# Patient Record
Sex: MALE | Race: WHITE
[De-identification: names, ages, dates, MRNs, and addresses within clinical notes are randomized per-mention and may not be internally consistent; named-entity substitution may affect disease eponyms.]

---

## 2017-01-05 ENCOUNTER — HOSPITAL ENCOUNTER (INPATIENT)
Dept: HOSPITAL 45 - C.EDB | Age: 72
LOS: 4 days | Discharge: HOME | DRG: 192 | End: 2017-01-09
Attending: HOSPITALIST | Admitting: HOSPITALIST
Payer: COMMERCIAL

## 2017-01-05 VITALS
WEIGHT: 133.6 LBS | BODY MASS INDEX: 20.97 KG/M2 | HEIGHT: 67 IN | WEIGHT: 133.6 LBS | HEIGHT: 67 IN | BODY MASS INDEX: 20.97 KG/M2

## 2017-01-05 VITALS
OXYGEN SATURATION: 97 % | HEART RATE: 71 BPM | TEMPERATURE: 97.52 F | SYSTOLIC BLOOD PRESSURE: 179 MMHG | DIASTOLIC BLOOD PRESSURE: 70 MMHG

## 2017-01-05 VITALS — HEART RATE: 90 BPM | OXYGEN SATURATION: 96 %

## 2017-01-05 DIAGNOSIS — Z79.82: ICD-10-CM

## 2017-01-05 DIAGNOSIS — I12.9: ICD-10-CM

## 2017-01-05 DIAGNOSIS — Z87.891: ICD-10-CM

## 2017-01-05 DIAGNOSIS — I45.10: ICD-10-CM

## 2017-01-05 DIAGNOSIS — Z79.51: ICD-10-CM

## 2017-01-05 DIAGNOSIS — K44.9: ICD-10-CM

## 2017-01-05 DIAGNOSIS — E78.5: ICD-10-CM

## 2017-01-05 DIAGNOSIS — K21.9: ICD-10-CM

## 2017-01-05 DIAGNOSIS — Z86.79: ICD-10-CM

## 2017-01-05 DIAGNOSIS — F41.9: ICD-10-CM

## 2017-01-05 DIAGNOSIS — Z99.81: ICD-10-CM

## 2017-01-05 DIAGNOSIS — N18.3: ICD-10-CM

## 2017-01-05 DIAGNOSIS — F32.9: ICD-10-CM

## 2017-01-05 DIAGNOSIS — Z79.52: ICD-10-CM

## 2017-01-05 DIAGNOSIS — Z79.899: ICD-10-CM

## 2017-01-05 DIAGNOSIS — K22.70: ICD-10-CM

## 2017-01-05 DIAGNOSIS — N40.0: ICD-10-CM

## 2017-01-05 DIAGNOSIS — Z85.51: ICD-10-CM

## 2017-01-05 DIAGNOSIS — J44.1: Primary | ICD-10-CM

## 2017-01-05 LAB
ALBUMIN/GLOB SERPL: 1 {RATIO} (ref 0.9–2)
ALP SERPL-CCNC: 98 U/L (ref 45–117)
ALT SERPL-CCNC: 28 U/L (ref 12–78)
ANION GAP SERPL CALC-SCNC: 8 MMOL/L (ref 3–11)
AST SERPL-CCNC: 21 U/L (ref 15–37)
BASOPHILS # BLD: 0.01 K/UL (ref 0–0.2)
BASOPHILS NFR BLD: 0.1 %
BUN SERPL-MCNC: 19 MG/DL (ref 7–18)
BUN/CREAT SERPL: 15.8 (ref 10–20)
CALCIUM SERPL-MCNC: 8.3 MG/DL (ref 8.5–10.1)
CHLORIDE SERPL-SCNC: 102 MMOL/L (ref 98–107)
CO2 SERPL-SCNC: 34 MMOL/L (ref 21–32)
COMPLETE: YES
CREAT CL PREDICTED SERPL C-G-VRATE: 48 ML/MIN
CREAT SERPL-MCNC: 1.2 MG/DL (ref 0.6–1.4)
EOSINOPHIL NFR BLD AUTO: 148 K/UL (ref 130–400)
GLOBULIN SER-MCNC: 3.1 GM/DL (ref 2.5–4)
GLUCOSE SERPL-MCNC: 95 MG/DL (ref 70–99)
HCT VFR BLD CALC: 37.7 % (ref 42–52)
IG%: 0.6 %
IMM GRANULOCYTES NFR BLD AUTO: 9.9 %
INR PPP: 1 (ref 0.9–1.1)
LYMPHOCYTES # BLD: 1.06 K/UL (ref 1.2–3.4)
MCH RBC QN AUTO: 29.3 PG (ref 25–34)
MCHC RBC AUTO-ENTMCNC: 32.1 G/DL (ref 32–36)
MCV RBC AUTO: 91.3 FL (ref 80–100)
MONOCYTES NFR BLD: 10.1 %
NEUTROPHILS # BLD AUTO: 1.8 %
NEUTROPHILS NFR BLD AUTO: 77.5 %
PMV BLD AUTO: 10 FL (ref 7.4–10.4)
POTASSIUM SERPL-SCNC: 3.8 MMOL/L (ref 3.5–5.1)
PROTHROMBIN TIME: 10.9 SECONDS (ref 9–12)
RBC # BLD AUTO: 4.13 M/UL (ref 4.7–6.1)
SODIUM SERPL-SCNC: 144 MMOL/L (ref 136–145)
WBC # BLD AUTO: 10.73 K/UL (ref 4.8–10.8)

## 2017-01-05 RX ADMIN — HEPARIN SODIUM SCH UNIT: 10000 INJECTION, SOLUTION INTRAVENOUS; SUBCUTANEOUS at 21:26

## 2017-01-05 RX ADMIN — LACTOBACILLUS TAB SCH TAB: TAB at 21:00

## 2017-01-05 RX ADMIN — CARVEDILOL SCH MG: 12.5 TABLET, FILM COATED ORAL at 21:21

## 2017-01-05 RX ADMIN — METHYLPREDNISOLONE SODIUM SUCCINATE SCH MLS/MIN: 1 INJECTION, POWDER, FOR SOLUTION INTRAMUSCULAR; INTRAVENOUS at 23:36

## 2017-01-05 RX ADMIN — BUDESONIDE AND FORMOTEROL FUMARATE DIHYDRATE SCH PUFFS: 160; 4.5 AEROSOL RESPIRATORY (INHALATION) at 21:19

## 2017-01-05 RX ADMIN — IPRATROPIUM BROMIDE AND ALBUTEROL SULFATE SCH ML: .5; 3 SOLUTION RESPIRATORY (INHALATION) at 21:27

## 2017-01-05 NOTE — HISTORY & PHYSICAL EXAMINATION
DATE OF ADMISSION:  01/05/2017

 

CHIEF COMPLAINT:  Shortness of breath.

 

HISTORY OF PRESENT ILLNESS:  This is a 71-year-old male with past medical

history significant for severe COPD, on oxygen at night, previous history of

smoking, hypertension, hyperlipidemia, chronic kidney disease stage III,

history of GERD, history of Pritchett esophagus, history of hiatal hernia,

history of bladder cancer who was recently in the hospital prior to [facility]

with severe COPD exacerbation and syncope, but he wanted to be discharged for

his [place] and the next day he did fine and got discharged with prednisone

taper and on p.o. antibiotics.  The patient says he did fine after that, he

has followed with family doctor also, but couple of days ago again he started

to have some cold-like symptoms and some sore throat and started to again

have shortness of breath and cough, which was not getting better, so he came

to the ER today.  Even after 1 hour of treatment, the patient is not back to

his baseline, so we are called for admission.  The patient denies any fever

or chills, but says whenever he gets short of breath has some chest heaviness. 

Denies any headaches, no dizziness, no nausea, no vomiting, no diarrhea, no

abdominal pain.  Resting comfortably and hemodynamically stable.

 

ALLERGIES:  IODINATED DIAGNOSTIC AGENTS.

 

PAST MEDICAL HISTORY:  As mentioned above.

 

PAST SURGICAL HISTORY:  EGD and upper GI endoscopy.

 

FAMILY HISTORY:  Significant for mother had COPD and father had COPD.

 

SOCIAL HISTORY:  , former smoker and drinks alcohol occasionally.

 

MEDICATIONS:  The patient is on prednisone rescue kit, albuterol 1-2 puffs

inhalation q. 4-6 hours p.r.n., amlodipine 5 mg p.o. daily, aspirin 81 mg

p.o. daily, Lipitor 40 mg p.o. daily, Symbicort 160/4.5 two puffs inhalation

b.i.d., Coreg 12.5 mg p.o. b.i.d., citalopram 20 mg p.o. daily, folic acid 1

mg p.o. daily, DuoNebs 3 mL q.i.d. nebulization, Lactinex 2 tablets p.o.

b.i.d., Ativan 1 mg p.o. b.i.d. p.r.n., omeprazole 20 mg p.o. daily, Flomax

0.4 mg p.o. daily.

 

REVIEW OF SYMPTOMS:  As per HPI.

 

PHYSICAL EXAMINATION:

GENERAL:  The patient is of moderate build, not in distress.

VITAL SIGNS:  Temperature 36.7, pulse 74, respiratory rate 22, blood pressure

151/73, oxygen 95% on 3 liters.

HEENT:  No pallor, no icterus.  Pupils equal, round, and reactive to light.

NECK:  No JVD, no neck masses, no carotid bruits.

CARDIOVASCULAR:  S1, S2 heard, regular rate and rhythm, no murmur, no gallop.

RESPIRATORY SYSTEM:  Clear to auscultation bilaterally, diminished bilateral

breath sounds.  Occasional wheezing, no crackles.

ABDOMEN:  Soft, bowel sounds present.  Nontender.  No distention.

CENTRAL NERVOUS SYSTEM:  Cranial nerves II-XII grossly intact.  Nonfocal.

EXTREMITIES:  No edema, no erythema.

 

LABORATORY DATA:  WBC 10.7, hemoglobin 12.1, hematocrit 37.7, platelets 148. 

Sodium 144, potassium 3.8, chloride 102, bicarb 34, BUN 19, creatinine 1.2,

serum glucose 95.  Calcium 8.3, total bilirubin 0.4, AST 21, ALT 28, alkaline

phosphatase 98, troponin 0.016  Rapid influenza A and B negative.

 

IMAGING DATA:  Chest x-ray, no acute findings seen.

 

EKG:  Shows normal sinus rhythm with rate of 69, right bundle branch block,

no significant change from previous EKG.

 

ASSESSMENT AND PLAN:  This is a 71-year-old male who presents with chronic

obstructive pulmonary disease exacerbation.

1.  Chronic obstructive pulmonary disease exacerbation.  Second admission in

the last few weeks.  We will admit to tele floor,start him on IV Solu-Medrol

40 mg p.o. t.i.d., DuoNebs q. 6 hours around the clock and q. 2 hours p.r.n.

and also p.o. azithromycin and monitored on tele floor.  We will check the

influenza pcr test.

2.  History of recent syncope , while will check

echocardiogram.  Follow with echocardiogram.

3.  History of gastroesophageal reflux disease.  Continue proton pump

inhibitor.

4.  History of hypertension.  Continue amlodipine and Coreg.  Will monitor

the blood pressure.

5.  History of depression and  anxiety. Celexa and Ativan p.r.n.  

6. BPH. continue Flomax.

6.  Deep vein thrombosis prophylaxis, sequential compression devices and

heparin subQ.

 

DISPOSITION:  Admit to tele floor.  Expect to discharge home and follow with

his family doctor.  Level 1 full code.

 

 

 

ANDRED

## 2017-01-05 NOTE — DIAGNOSTIC IMAGING REPORT
CHEST ONE VIEW PORTABLE



CLINICAL HISTORY: Respiratory distress. Dyspnea.    



COMPARISON STUDY:  Chest radiograph December 24, 2016.



FINDINGS: There is no pneumothorax or pleural effusion. No consolidation is

identified. There is no evidence of pulmonary edema. Cardiac size is normal.

Mediastinal contours are normal. There is suspected underlying emphysema. Healed

left rib fractures are noted. 



IMPRESSION:  



1. No acute cardiopulmonary findings.



2. Emphysema. 









Electronically signed by:  Je Shaffer M.D.

1/5/2017 3:16 PM



Dictated Date/Time:  1/5/2017 3:13 PM

## 2017-01-05 NOTE — EMERGENCY ROOM VISIT NOTE
History


Report prepared by Luis Miguel:  Elsa Peña


Under the Supervision of:  Dr. Andriy Martinez M.D.


First contact with patient:  14:24


Chief Complaint:  RESPIRATORY PROBLEMS


Stated Complaint:  COPD/BREATHING


Nursing Triage Summary:  


Pt c/o SOB, cough, productive, yellow, couple days, chest tightness. Denies 


vomiting.





Pulse ox 89-91% on RA. HX COPD





Pt wears 2 L O2 NC at home.





History of Present Illness


The patient is a 71 year old male who presents to the Emergency Room with 

complaints of persistent, worsening breathing difficulties that began 

yesterday.  He currently rates his discomfort as a 2.5/10 in severity.  The 

patient states that his breathing difficulties began yesterday and additionally 

notes heaviness in his chest, and productive cough with yellow sputum.  He 

notes a history of COPD.  The patient's wife notes that the patient was 

evaluated in the hospital on 12/24/16 for a COPD exacerbation and was 

discharged on 12/25/16.  The patient notes that he is chronically on 

supplemental oxygen at home.  He notes persistent urinary problems, but states 

that has been ongoing for awhile.  The patient states that his symptoms today 

feel similar to his previous COPD exacerbations.  He notes that he is on a 

current prednisone taper, stating that he takes 20 mg per day.  The patient 

notes that his supplemental oxygen he was on was increased after his last visit 

to the emergency department.  Pt denies LOC, headache, fevers, chills, 

diaphoresis, visual changes, neck pain, nausea, vomiting, abdominal pain, back 

pain, melena, hematochezia, urinary symptoms, numbness, weakness, 

lymphadenopathy, rash, or other complaints.





   Source of History:  patient, spouse/significant other (wife)


   Onset:  yesterday


   Position:  other (global)


   Symptom Intensity:  2.5/10


   Quality:  other (breathing difficulties)


   Timing:  worsening, other (persistent)


   Associated Symptoms:  + chest pain (heaviness), + cough (productive, yellow 

sputum)





Review of Systems


See HPI for pertinent positives and negatives.  A total of ten systems were 

reviewed and were otherwise negative.





Past Medical & Surgical


Medical Problems:


(1) Anxiety


(2) CKD (chronic kidney disease) stage 3, GFR 30-59 ml/min


(3) Claudication


(4) COPD (chronic obstructive pulmonary disease)


(5) COPD exacerbation


(6) Dyslipidemia


(7) Elevated troponin


(8) High-grade bladder cancer


(9) History of smoking greater than 50 pack years


(10) HTN (hypertension)


(11) Hypoxia


(12) RBBB (right bundle branch block)


(13) Reflux esophagitis


(14) Shortness of breath


(15) Unresponsive episode


Surgical Problems:


(1) History of endarterectomy


(2) History of tonsillectomy and adenoidectomy








Family History





No pertinent family history





Social History


Smoking Status:  Former Smoker


Drug Use:  none


Marital Status:  


Housing Status:  lives with family


Occupation Status:  retired





Current/Historical Medications


Scheduled


Amlodipine Besylate (Amlodipine Besylate), 5 MG PO DAILY


Aspirin (Aspir-81), 1 TAB PO DAILY


Atorvastatin (Lipitor), 40 MG PO DAILY


Budesonide/Formoterol Fumarate (Symbicort 160/4.5 Inhaler ), 2 PUFFS INH BID


Carvedilol (Coreg), 12.5 MG PO BID


Cefuroxime Axetil (Cefuroxime Axetil), 500 MG PO BID


Citalopram (Citalopram Hydrobromide), 20 MG PO DAILY


Doxycycline Monohydrate (Monodox), 100 MG PO BID


Folic Acid (Folic Acid), 1 MG PO DAILY


Ipratropium-Albuterol (Duoneb), 3 ML PO QID


Lactobacillus Acidophilus (Lactinex), 2 TAB PO BID


Omeprazole (Prilosec), 20 MG PO DAILY


Prednisone (Prednisone), PO UD


Prednisone Tab (Prednisone), 60 MG PO UD


Tamsulosin Hcl (Flomax), 0.4 MG PO DAILY





Scheduled PRN


Albuterol (Proair Hfa), 1-2 PUFFS INH Q4-6HOURS PRN for Wheezing


Lorazepam (Lorazepam), 1 MG PO BID PRN for Anxiety





Allergies


Coded Allergies:  


     Iodinated Diagnostic Agents (Verified  Allergy, Unknown, RASH,FACIAL 

SWELLING, 1/5/17)





Physical Exam


Vital Signs











  Date Time  Temp Pulse Resp B/P Pulse Ox O2 Delivery O2 Flow Rate FiO2


 


1/5/17 18:06      Nasal Cannula  


 


1/5/17 17:10  74 22  95   


 


1/5/17 17:05  74 21     


 


1/5/17 17:00  73 27  97   


 


1/5/17 16:55  75 21  100   


 


1/5/17 16:50  71 25     


 


1/5/17 16:45  71 26  100   


 


1/5/17 16:40  72 27     


 


1/5/17 16:35  70 24  100   


 


1/5/17 16:30  73 21     


 


1/5/17 16:25  66 18     


 


1/5/17 16:20  69 21  100   


 


1/5/17 16:15  68 22  100   


 


1/5/17 16:10  69 21  99   


 


1/5/17 16:05  70 18     


 


1/5/17 16:00  70 19  99   


 


1/5/17 15:55  71 23  100   


 


1/5/17 15:54  90 20  96 Nasal Cannula 3.0 


 


1/5/17 15:50  72 20  97   


 


1/5/17 15:45  71 23  95   


 


1/5/17 15:40  73 27  95   


 


1/5/17 15:35  80 19     


 


1/5/17 15:30  75 23     


 


1/5/17 15:25  73 23     


 


1/5/17 15:20  71 24  98   


 


1/5/17 15:15  70 19  96   


 


1/5/17 15:10  70 26  96   


 


1/5/17 15:05  68 28  97   


 


1/5/17 15:00  68 23  98   


 


1/5/17 14:55  70 21  98   


 


1/5/17 14:50  70 17  98   


 


1/5/17 14:45  69 26  99   


 


1/5/17 14:40  70 21  97   


 


1/5/17 14:35  68 23  98   


 


1/5/17 14:33  68      


 


1/5/17 14:30  68 22  98   


 


1/5/17 14:28     95  2.5 


 


1/5/17 14:28     95 Nasal Cannula 2.5 


 


1/5/17 14:22     90 Room Air  


 


1/5/17 14:19 36.7 74 18 151/73 91 Room Air  











Physical Exam


GENERAL: Awake, alert, well-appearing, in no distress


HENT: Normocephalic, atraumatic. Oropharynx unremarkable.


EYES: Normal conjunctiva. Sclera non-icteric.


NECK: Supple. No nuchal rigidity. FROM. No JVD.


RESPIRATORY: Bilateral expiratory wheezes, decreased breath sounds bilaterally.


CARDIAC: Regular rate, normal rhythm. Extremities warm and well perfused. 

Pulses equal.


ABDOMEN: Soft, non-distended. No tenderness to palpation. No rebound or 

guarding. No masses.


RECTAL: Deferred.


MUSCULOSKELETAL: Chest examination reveals no tenderness. The back is 

symmetrical on inspection without obvious abnormality. There is no CVA 

tenderness to palpation. No joint edema. 


LOWER EXTREMITIES: Calves are equal size bilaterally and non-tender. No edema. 

No discoloration. 


NEURO: Normal sensorium. No sensory or motor deficits noted. 


SKIN: No rash or jaundice noted.





Medical Decision & Procedures


ER Provider


Diagnostic Interpretation:


X-ray: Per my interpretation, radiologist review. 





CHEST ONE VIEW PORTABLE





CLINICAL HISTORY: Respiratory distress. Dyspnea.    





COMPARISON STUDY:  Chest radiograph December 24, 2016.





FINDINGS: There is no pneumothorax or pleural effusion. No consolidation is


identified. There is no evidence of pulmonary edema. Cardiac size is normal.


Mediastinal contours are normal. There is suspected underlying emphysema. Healed


left rib fractures are noted. 





IMPRESSION:  





1. No acute cardiopulmonary findings.





2. Emphysema. 





Electronically signed by:  Je Shaffer M.D.


1/5/2017 3:16 PM





Dictated Date/Time:  1/5/2017 3:13 PM





Laboratory Results


1/5/17 14:40








Red Blood Count 4.13, Mean Corpuscular Volume 91.3, Mean Corpuscular Hemoglobin 

29.3, Mean Corpuscular Hemoglobin Concent 32.1, Mean Platelet Volume 10.0, 

Neutrophils (%) (Auto) 77.5, Lymphocytes (%) (Auto) 9.9, Monocytes (%) (Auto) 

10.1, Eosinophils (%) (Auto) 1.8, Basophils (%) (Auto) 0.1, Neutrophils # (Auto

) 8.33, Lymphocytes # (Auto) 1.06, Monocytes # (Auto) 1.08, Eosinophils # (Auto

) 0.19, Basophils # (Auto) 0.01





1/5/17 14:40

















Test


  1/5/17


14:40 1/5/17


14:50


 


White Blood Count


  10.73 K/uL


(4.8-10.8) 


 


 


Red Blood Count


  4.13 M/uL


(4.7-6.1) 


 


 


Hemoglobin


  12.1 g/dL


(14.0-18.0) 


 


 


Hematocrit 37.7 % (42-52)  


 


Mean Corpuscular Volume


  91.3 fL


() 


 


 


Mean Corpuscular Hemoglobin


  29.3 pg


(25-34) 


 


 


Mean Corpuscular Hemoglobin


Concent 32.1 g/dl


(32-36) 


 


 


Platelet Count


  148 K/uL


(130-400) 


 


 


Mean Platelet Volume


  10.0 fL


(7.4-10.4) 


 


 


Neutrophils (%) (Auto) 77.5 %  


 


Lymphocytes (%) (Auto) 9.9 %  


 


Monocytes (%) (Auto) 10.1 %  


 


Eosinophils (%) (Auto) 1.8 %  


 


Basophils (%) (Auto) 0.1 %  


 


Neutrophils # (Auto)


  8.33 K/uL


(1.4-6.5) 


 


 


Lymphocytes # (Auto)


  1.06 K/uL


(1.2-3.4) 


 


 


Monocytes # (Auto)


  1.08 K/uL


(0.11-0.59) 


 


 


Eosinophils # (Auto)


  0.19 K/uL


(0-0.5) 


 


 


Basophils # (Auto)


  0.01 K/uL


(0-0.2) 


 


 


RDW Standard Deviation


  45.5 fL


(36.4-46.3) 


 


 


RDW Coefficient of Variation


  13.6 %


(11.5-14.5) 


 


 


Immature Granulocyte % (Auto) 0.6 %  


 


Immature Granulocyte # (Auto)


  0.06 K/uL


(0.00-0.02) 


 


 


Anion Gap


  8.0 mmol/L


(3-11) 


 


 


Est Creatinine Clear Calc


Drug Dose 48.0 ml/min 


  


 


 


Estimated GFR (


American) 70.1 


  


 


 


Estimated GFR (Non-


American 60.5 


  


 


 


BUN/Creatinine Ratio 15.8 (10-20)  


 


Calcium Level


  8.3 mg/dl


(8.5-10.1) 


 


 


Total Bilirubin


  0.4 mg/dl


(0.2-1) 


 


 


Aspartate Amino Transf


(AST/SGOT) 21 U/L (15-37) 


  


 


 


Alanine Aminotransferase


(ALT/SGPT) 28 U/L (12-78) 


  


 


 


Alkaline Phosphatase


  98 U/L


() 


 


 


Troponin I


  0.016 ng/ml


(0-0.045) 


 


 


Total Protein


  6.2 gm/dl


(6.4-8.2) 


 


 


Albumin


  3.1 gm/dl


(3.4-5.0) 


 


 


Globulin


  3.1 gm/dl


(2.5-4.0) 


 


 


Albumin/Globulin Ratio 1.0 (0.9-2)  


 


Chemistry Specimen Hemolysis   


 


Influenza Type A Antigen


  


  Neg for Influ


A (NEG)


 


Influenza Type B Antigen


  


  Neg for Influ


B (NEG)








Laboratory results reviewed by me





Medications Administered











 Medications


  (Trade)  Dose


 Ordered  Sig/Gasper


 Route  Start Time


 Stop Time Status Last Admin


Dose Admin


 


 Methylprednisolone


 Sodium Succinate


  (Solu-Medrol IV)  125 mg  NOW  STAT


 IV  1/5/17 14:48


 1/5/17 14:50 DC 1/5/17 15:59


125 MG


 


 Albuterol/


 Ipratropium


  (Duoneb)  12 ml  ONE  ONCE


 INH  1/5/17 15:00


 1/5/17 15:01 DC 1/5/17 15:00


12 ML











ECG


Indication:  SOB/dyspnea


Rate (beats per minute):  69


Rhythm:  normal sinus


Findings:  RBBB, no acute ischemic change, no ectopy





ED Course


1443: The patient was evaluated in room A9B. A complete history and physical 

exam was performed.





1448: Ordered Solu-Medrol  mg IV.





1500: Ordered DuoNeb 12 ml INH.





1714: I reevaluated the patient and he is feeling and looking much better.  I 

discussed the exam findings with him and I discussed the treatment plan.  Upon 

reexamination the patient still has slight wheezing that is worse on the right 

side.  He verbalized complete understanding and agreement with the treatment 

plan.  He will be evaluated for further treatment.





1733: I discussed the patients case with Daksha Lock.  He is going 

to evaluate the patient for further treatment.





Medical Decision


Triage Nursing notes reviewed.


The patient's presentation and history were concerning for shortness of breath.

  





Etiologies such as  pneumonia, COPD, reactive airway disease, CHF, cardiac 

ischemia, pulmonary embolism, pneumothorax, musculoskeletal, infections, 

gastrointestinal, as well as others were entertained. 





The patient was evaluated.  He was wheezing.  He has a long history of COPD.  

He was given Solu-Medrol and an hour-long DuoNeb treatment.  Blood work was 

obtained.  Chest x-ray revealed emphysematous changes without pneumonia.  No 

pneumothorax.  The patient had a nonischemic ECG.  His CBC showed a mild 

anemia.  Chemical panel LFTs and.  Were negative.  On reassessment the patient 

still had some slight wheezing.  He has been on steroids, antibiotics and 

breathing treatments as an outpatient and despite that he was getting worse.  

He has significant emphysema and will be benefited by further treatment in the 

hospital.  The patient was in agreement as he was not feeling very good at 

home.  Consultation was made with the hospitalist and the patient was evaluated 

for further treatment.








The chart was completed utilizing Dragon Speech voice recognition software.   

Grammatical errors, random word insertions, pronoun errors, and incomplete 

sentences are an occasional consequence of this system due to software 

limitations, ambient noise, and hardware issues.  Any formal questions or 

concerns about the content, text, or information contained within the body of 

this dictation should be directly addressed to the physician for clarification.





Consults


Time Called:  1722


Consulting Physician:  Daksha Lock


Returned Call:  1733


I discussed the patients case with Daksha Lock.  He is going to 

evaluate the patient for further treatment.





Impression





 Primary Impression:  COPD exacerbation





Scribe Attestation


The scribe's documentation has been prepared under my direction and personally 

reviewed by me in its entirety. I confirm that the note above accurately 

reflects all work, treatment, procedures, and medical decision making performed 

by me.





Departure Information


Dispostion


Being Evaluated By Hospitalist





Referrals


No Doctor, Assigned (PCP)

## 2017-01-06 VITALS
SYSTOLIC BLOOD PRESSURE: 167 MMHG | HEART RATE: 73 BPM | DIASTOLIC BLOOD PRESSURE: 73 MMHG | TEMPERATURE: 98.42 F | OXYGEN SATURATION: 96 %

## 2017-01-06 VITALS
OXYGEN SATURATION: 98 % | TEMPERATURE: 97.88 F | SYSTOLIC BLOOD PRESSURE: 203 MMHG | HEART RATE: 69 BPM | DIASTOLIC BLOOD PRESSURE: 97 MMHG

## 2017-01-06 VITALS — OXYGEN SATURATION: 98 % | HEART RATE: 74 BPM

## 2017-01-06 VITALS
TEMPERATURE: 97.34 F | HEART RATE: 66 BPM | DIASTOLIC BLOOD PRESSURE: 91 MMHG | OXYGEN SATURATION: 98 % | SYSTOLIC BLOOD PRESSURE: 182 MMHG

## 2017-01-06 VITALS
TEMPERATURE: 96.98 F | HEART RATE: 76 BPM | OXYGEN SATURATION: 95 % | DIASTOLIC BLOOD PRESSURE: 71 MMHG | SYSTOLIC BLOOD PRESSURE: 187 MMHG

## 2017-01-06 VITALS
OXYGEN SATURATION: 100 % | DIASTOLIC BLOOD PRESSURE: 75 MMHG | SYSTOLIC BLOOD PRESSURE: 176 MMHG | TEMPERATURE: 97.7 F | HEART RATE: 85 BPM

## 2017-01-06 VITALS — HEART RATE: 78 BPM | OXYGEN SATURATION: 96 %

## 2017-01-06 VITALS — HEART RATE: 74 BPM | OXYGEN SATURATION: 98 %

## 2017-01-06 VITALS — OXYGEN SATURATION: 96 % | HEART RATE: 78 BPM

## 2017-01-06 VITALS
SYSTOLIC BLOOD PRESSURE: 170 MMHG | TEMPERATURE: 97.7 F | HEART RATE: 80 BPM | OXYGEN SATURATION: 98 % | DIASTOLIC BLOOD PRESSURE: 71 MMHG

## 2017-01-06 VITALS — DIASTOLIC BLOOD PRESSURE: 78 MMHG | SYSTOLIC BLOOD PRESSURE: 148 MMHG | HEART RATE: 98 BPM

## 2017-01-06 LAB
ANION GAP SERPL CALC-SCNC: 10 MMOL/L (ref 3–11)
APPEARANCE UR: CLEAR
BASOPHILS # BLD: 0 K/UL (ref 0–0.2)
BASOPHILS NFR BLD: 0 %
BILIRUB UR-MCNC: (no result) MG/DL
BUN SERPL-MCNC: 29 MG/DL (ref 7–18)
BUN/CREAT SERPL: 19.5 (ref 10–20)
CALCIUM SERPL-MCNC: 8.6 MG/DL (ref 8.5–10.1)
CHLORIDE SERPL-SCNC: 100 MMOL/L (ref 98–107)
CO2 SERPL-SCNC: 31 MMOL/L (ref 21–32)
COLOR UR: YELLOW
COMPLETE: YES
CREAT CL PREDICTED SERPL C-G-VRATE: 37.9 ML/MIN
CREAT SERPL-MCNC: 1.5 MG/DL (ref 0.6–1.4)
EOSINOPHIL NFR BLD AUTO: 141 K/UL (ref 130–400)
FLUAV RNA SPEC QL NAA+PROBE: (no result)
FLUBV RNA SPEC QL NAA+PROBE: (no result)
GLUCOSE SERPL-MCNC: 196 MG/DL (ref 70–99)
HCT VFR BLD CALC: 35.9 % (ref 42–52)
IG%: 0.2 %
IMM GRANULOCYTES NFR BLD AUTO: 10.2 %
LYMPHOCYTES # BLD: 0.82 K/UL (ref 1.2–3.4)
MAGNESIUM SERPL-MCNC: (no result) MG/DL (ref 1.8–2.4)
MAGNESIUM SERPL-MCNC: 1.9 MG/DL (ref 1.8–2.4)
MANUAL MICROSCOPIC REQUIRED?: NO
MCH RBC QN AUTO: 29.5 PG (ref 25–34)
MCHC RBC AUTO-ENTMCNC: 33.4 G/DL (ref 32–36)
MCV RBC AUTO: 88.2 FL (ref 80–100)
MONOCYTES NFR BLD: 4.7 %
NEUTROPHILS # BLD AUTO: 0 %
NEUTROPHILS NFR BLD AUTO: 84.9 %
NITRITE UR QL STRIP: (no result)
PH UR STRIP: 5 [PH] (ref 4.5–7.5)
PMV BLD AUTO: 9.9 FL (ref 7.4–10.4)
POTASSIUM SERPL-SCNC: (no result) MMOL/L (ref 3.5–5.1)
POTASSIUM SERPL-SCNC: 3.7 MMOL/L (ref 3.5–5.1)
RBC # BLD AUTO: 4.07 M/UL (ref 4.7–6.1)
REVIEW REQ?: NO
SODIUM SERPL-SCNC: 141 MMOL/L (ref 136–145)
SP GR UR STRIP: 1.02 (ref 1–1.03)
URINE BILL WITH OR WITHOUT MIC: (no result)
UROBILINOGEN UR-MCNC: (no result) MG/DL
WBC # BLD AUTO: 8.07 K/UL (ref 4.8–10.8)

## 2017-01-06 RX ADMIN — IPRATROPIUM BROMIDE AND ALBUTEROL SULFATE SCH ML: .5; 3 SOLUTION RESPIRATORY (INHALATION) at 11:37

## 2017-01-06 RX ADMIN — CITALOPRAM HYDROBROMIDE SCH MG: 20 TABLET ORAL at 09:29

## 2017-01-06 RX ADMIN — AMLODIPINE BESYLATE SCH MG: 5 TABLET ORAL at 09:31

## 2017-01-06 RX ADMIN — METHYLPREDNISOLONE SODIUM SUCCINATE SCH MLS/MIN: 1 INJECTION, POWDER, FOR SOLUTION INTRAMUSCULAR; INTRAVENOUS at 09:28

## 2017-01-06 RX ADMIN — IPRATROPIUM BROMIDE AND ALBUTEROL SULFATE SCH ML: .5; 3 SOLUTION RESPIRATORY (INHALATION) at 08:12

## 2017-01-06 RX ADMIN — HEPARIN SODIUM SCH UNIT: 10000 INJECTION, SOLUTION INTRAVENOUS; SUBCUTANEOUS at 21:12

## 2017-01-06 RX ADMIN — CARVEDILOL SCH MG: 12.5 TABLET, FILM COATED ORAL at 20:38

## 2017-01-06 RX ADMIN — PANTOPRAZOLE SCH MG: 40 TABLET, DELAYED RELEASE ORAL at 09:32

## 2017-01-06 RX ADMIN — AZITHROMYCIN SCH MG: 250 TABLET, FILM COATED ORAL at 09:30

## 2017-01-06 RX ADMIN — CARVEDILOL SCH MG: 12.5 TABLET, FILM COATED ORAL at 09:29

## 2017-01-06 RX ADMIN — LORAZEPAM PRN MG: 1 TABLET ORAL at 17:56

## 2017-01-06 RX ADMIN — HEPARIN SODIUM SCH UNIT: 10000 INJECTION, SOLUTION INTRAVENOUS; SUBCUTANEOUS at 09:36

## 2017-01-06 RX ADMIN — TAMSULOSIN HYDROCHLORIDE SCH MG: 0.4 CAPSULE ORAL at 09:30

## 2017-01-06 RX ADMIN — BUDESONIDE AND FORMOTEROL FUMARATE DIHYDRATE SCH PUFFS: 160; 4.5 AEROSOL RESPIRATORY (INHALATION) at 20:39

## 2017-01-06 RX ADMIN — LACTOBACILLUS TAB SCH TAB: TAB at 17:51

## 2017-01-06 RX ADMIN — LACTOBACILLUS TAB SCH TAB: TAB at 09:35

## 2017-01-06 RX ADMIN — IPRATROPIUM BROMIDE AND ALBUTEROL SULFATE SCH ML: .5; 3 SOLUTION RESPIRATORY (INHALATION) at 18:42

## 2017-01-06 RX ADMIN — IPRATROPIUM BROMIDE AND ALBUTEROL SULFATE SCH ML: .5; 3 SOLUTION RESPIRATORY (INHALATION) at 15:41

## 2017-01-06 RX ADMIN — Medication SCH MG: at 09:32

## 2017-01-06 RX ADMIN — ATORVASTATIN CALCIUM SCH MG: 40 TABLET, FILM COATED ORAL at 09:32

## 2017-01-06 RX ADMIN — METHYLPREDNISOLONE SODIUM SUCCINATE SCH MLS/MIN: 1 INJECTION, POWDER, FOR SOLUTION INTRAMUSCULAR; INTRAVENOUS at 17:51

## 2017-01-06 RX ADMIN — BUDESONIDE AND FORMOTEROL FUMARATE DIHYDRATE SCH PUFFS: 160; 4.5 AEROSOL RESPIRATORY (INHALATION) at 09:32

## 2017-01-06 NOTE — PROGRESS NOTE
Internal Med Progress Note


Date of Service:


Jan 6, 2017.


Provider Documentation:





SUBJECTIVE:





feeling better


sob and cough improved


afebrile


no chest pain








OBJECTIVE:





Vital Signs-as noted below





Exam:


General-alert and oriented x 3


ENT-normal hearing


Neck-no neck masses


Lungs-cta b/l  mild b/l rhonchi and wheezing


Heart-s1 and s2 heard regular rate and rhythm, no murmurs


Abdomen-soft bowel sounds present non tender no distension 


Extremities-no edema no erythema 


Neuro-alert and awake


moves extremities





Lab data as noted below.


ASSESSMENT & PLAN:


This is a 71-year-old male who presents with chronic


obstructive pulmonary disease exacerbation.


1.  Chronic obstructive pulmonary disease exacerbation.  Second admission in


the last few weeks.  Monitor in tele floor,start him on IV Solu-Medrol


40 mg p.o. t.i.d., DuoNebs q. 6 hours around the clock and q. 2 hours p.r.n.


and also p.o. azithromycin and monitored on tele floor.  We will check the


influenza pcr test.Improving. Continue same.





2.  History of recent syncope ,echo unremarkable. No more episodes.





3.  History of gastroesophageal reflux disease.  Continue proton pump


inhibitor.





4.  History of hypertension.  Continue amlodipine and Coreg.  Iv hydralazine 

prn .Will monitor


the blood pressure.





5.  History of depression and  anxiety. Celexa and Ativan p.r.n.  





6. BPH. continue Flomax.





6.  Deep vein thrombosis prophylaxis, sequential compression devices and


heparin subQ.








DISPOSITION


to be determined


Vital Signs:











  Date Time  Temp Pulse Resp B/P Pulse Ox O2 Delivery O2 Flow Rate FiO2


 


1/6/17 18:44  74 18  98 Nasal Cannula 2.0 


 


1/6/17 17:18  98  148/78    


 


1/6/17 15:51      Nasal Cannula 2.0 


 


1/6/17 15:41  74 18  98 Nasal Cannula 2.0 


 


1/6/17 15:13 36.5 80 19 170/71 98 Nasal Cannula 2.0 


 


1/6/17 13:16 36.9 73 19 167/73 96 Nasal Cannula 2.5 


 


1/6/17 12:00      Nasal Cannula 2.0 


 


1/6/17 11:44  78 18  96 Nasal Cannula 2.0 


 


1/6/17 08:12  78 18  96 Nasal Cannula 2.0 


 


1/6/17 08:10      Nasal Cannula 2.0 


 


1/6/17 07:30 36.1 76 18 187/71 95 Nasal Cannula 2.0 


 


1/6/17 05:23 36.6 69 18 203/97 98  2.5 


 


1/6/17 04:00      Nasal Cannula 2.0 


 


1/6/17 00:32 36.3 66 18 182/91 98  3.0 


 


1/6/17 00:00      Nasal Cannula 2.0 


 


1/5/17 21:45 36.4 71 18 179/70 97 Nasal Cannula 2.0 


 


1/5/17 21:45      Nasal Cannula 2.0 








Lab Results:





Results Past 24 Hours








Test


  1/5/17


19:38 1/6/17


06:34 1/6/17


09:36 1/6/17


13:23 Range/Units


 


 


Prothrombin Time


  10.9


  


  


  


  9.0-12.0


SECONDS


 


Prothromb Time International


Ratio 1.0


  


  


  


  0.9-1.1  


 


 


White Blood Count  8.07   4.8-10.8  K/uL


 


Red Blood Count  4.07   4.7-6.1  M/uL


 


Hemoglobin  12.0   14.0-18.0  g/dL


 


Hematocrit  35.9   42-52  %


 


Mean Corpuscular Volume  88.2     fL


 


Mean Corpuscular Hemoglobin  29.5   25-34  pg


 


Mean Corpuscular Hemoglobin


Concent 


  33.4


  


  


  32-36  g/dl


 


 


Platelet Count  141   130-400  K/uL


 


Mean Platelet Volume  9.9   7.4-10.4  fL


 


Neutrophils (%) (Auto)  84.9    %


 


Lymphocytes (%) (Auto)  10.2    %


 


Monocytes (%) (Auto)  4.7    %


 


Eosinophils (%) (Auto)  0.0    %


 


Basophils (%) (Auto)  0.0    %


 


Neutrophils # (Auto)  6.85   1.4-6.5  K/uL


 


Lymphocytes # (Auto)  0.82   1.2-3.4  K/uL


 


Monocytes # (Auto)  0.38   0.11-0.59  K/uL


 


Eosinophils # (Auto)  0.00   0-0.5  K/uL


 


Basophils # (Auto)  0.00   0-0.2  K/uL


 


RDW Standard Deviation  42.6   36.4-46.3  fL


 


RDW Coefficient of Variation  13.2   11.5-14.5  %


 


Immature Granulocyte % (Auto)  0.2    %


 


Immature Granulocyte # (Auto)  0.02   0.00-0.02  K/uL


 


Sodium Level   141  136-145  mmol/L


 


Potassium Level    3.7 3.5-5.1  mmol/L


 


Chloride Level   100    mmol/L


 


Carbon Dioxide Level   31  21-32  mmol/L


 


Anion Gap   10.0  3-11  mmol/L


 


Blood Urea Nitrogen   29  7-18  mg/dl


 


Creatinine


  


  


  1.50


  


  0.60-1.40


mg/dl


 


Est Creatinine Clear Calc


Drug Dose 


  


  37.9


  


   ml/min


 


 


Estimated GFR (


American) 


  


  53.5


  


   


 


 


Estimated GFR (Non-


American 


  


  46.2


  


   


 


 


BUN/Creatinine Ratio   19.5  10-20  


 


Random Glucose   196  70-99  mg/dl


 


Calcium Level   8.6  8.5-10.1  mg/dl


 


Magnesium Level    1.9 1.8-2.4  mg/dl


 


Troponin I   < 0.015  0-0.045  ng/ml














Test


  1/6/17


18:00 


  


  


  Range/Units

## 2017-01-06 NOTE — CLINICAL DOCUMENTATION QUERY
MARIALUISA Echols :



**** CLINICAL DOCUMENTATION QUERY****



Patient is a 71 year old male admitted for recurrent COPD exacerbation.  The patient is 
noted to be "chronically on supplemental oxygen at home".  Historical documentation 
includes a diagnosis of chronic respiratory failure.  Please clarify as clinically 
appropriate as this directly affects DRG assignment.  Thank you.  



In your clinical opinion is this patient being managed for:

    

                        ( x ) Chronic hypoxic respiratory failure 

                        (  ) Other explanation of clinical findings (Please Explain)

                        (  ) Unable to determine (Please Define)

                        (  ) Need to Discuss

                        (  ) Not Agree



The medical record reflects the following clinical findings, treatment, and risk factors.  
  



Clinical Indicators: As above

Treatment: Supplemental oxygen

Risk Factors: COPD/smoking history



Please clarify and document your clinical opinion in the progress notes and discharge 
summary. Terms such as "probable", "suspected", "likely", "questionable", "possible", or 
"still to be ruled out" are acceptable. 



*****IF IN AGREEMENT, YOU MUST DOCUMENT ABOVE DIAGNOSTIC STATEMENT IN DAILY PROGRESS NOTES 
AND DISCHARGE SUMMARY. This document is not part of the patient's record.*****

Thank You, Jethro Vidal, [phone]

## 2017-01-06 NOTE — ECHOCARDIOGRAM REPORT
*NOTICE TO RECEIVING PARTY AGENCY**  This information is strictly Confidential and protected under 
[place] law.  [place] law prohibits you from making any further disclosure of this 
information unless further disclosure is expressly permitted by the written consent of the person to 
whom it pertains or is authorized by law.  A general authorization for the release of medical or 
other information is not sufficient for this purpose.  Hospital accepts no responsibility if the 
information is made available to any other person, INCLUDING THE PATIENT.



Interpretation Summary

  *  Name: ALY CHAU  Study Date: 2017 08:42 AM  BP: 187/71 mmHg

  *  MRN: [id number]  Patient Location: [facility]\S\N288\S\1  HR: 91

  *  : 1945 (M/d/yyyy)  Gender: Male  Height: 67 in

  *  Age: 71 yrs  Ethnicity: [place]  Weight: 132 lb

  *  Ordering Physician: Kuldip Dumont

  *  Referring Physician: Self, Referred

  *  Performed By: Summer Bustos RCS

  *  Accession# [id number]  Account# [id number]

  *  Reason For Study: RECENT H/O OF SYNCOPE

  *  BSA: 1.7 m2

  *  -- Conclusions --

  *  No change compared to previous study of 10/7/15.

  *  Normal LV chamber size with mild concentric LVH.

  *  Normal LV systolic function, EF 60-65%.

  *  No segmental left ventricular wall motion abnormalities are noted.

  *  Grade I diastolic dysfunction.

  *  No significant valvular pathology.

Procedure Details

  *  A complete two-dimensional transthoracic echocardiogram was performed (2D, M-mode, Doppler and 
color flow Doppler).

Left Ventricle

  *  The left ventricle is normal in size.

  *  There is mild concentric left ventricular hypertrophy.

  *  Left ventricular systolic function is normal.

  *  No segmental left ventricular wall motion abnormalities are noted.

  *  Ejection Fraction = 60-65%.

  *  The left ventricular wall motion is normal.

Right Ventricle

  *  The right ventricular cavity size is normal (basal dimension <4.2 cm in right ventricular 
apical 4-chamber view).

  *  The right ventricular systolic function is normal as assessed by tricuspid annular plane 
systolic excursion (TAPSE) (normal >1.5 cm).

Atria

  *  The left atrial size is normal.

  *  Right atrial size is normal.

  *  No ASD detected; PFO is not assessed.

Mitral Valve

  *  The mitral valve is normal in structure and function.

Tricuspid Valve

  *  The tricuspid valve is normal in structure and function.

Aortic Valve

  *  The aortic valve is normal in structure and function.

Pulmonic Valve

  *  The pulmonary valve is not well seen, but the Doppler examination is normal without significant 
regurgitation or stenosis.

Great Vessels

  *  The aortic root is normal size.

Pericardium/Pleural

  *  There is no pericardial effusion.

Left Ventricular Diastolic Function

  *  Grade I diastolic dysfunction, (abnormal relaxation pattern).



MMode 2D Measurements and Calculations

IVSd 1.3 cm

IVSs 1.6 cm



LVIDd 4.1 cm

LVIDs 2.9 cm

LVPWd 1.2 cm

LVPWs 1.3 cm



IVS/LVPW 1.1 

FS 29.9 %

EDV(Teich) 73.2 ml

ESV(Teich) 31.1 ml

EF(Teich) 57.5 %



EDV(cubed) 67.8 ml

ESV(cubed) 23.4 ml

EF(cubed) 65.5 %

% IVS thick 22.0 %

% LVPW thick 9.9 %





LV mass(C)d 178.8 grams

LV mass(C)dI 105.5 grams/m\S\2

LV mass(C)s 138.7 grams

LV mass(C)sI 81.8 grams/m\S\2



SV(Teich) 42.1 ml

SI(Teich) 24.8 ml/m\S\2

SV(cubed) 44.4 ml

SI(cubed) 26.2 ml/m\S\2



Ao root diam 3.6 cm

Ao root area 10.4 cm\S\2

ACS 1.8 cm

LA dimension 3.3 cm



LA/Ao 0.91 

LVOT diam 2.0 cm

LVOT area 3.2 cm\S\2





LVAd ap4 26.0 cm\S\2

LVLd ap4 8.4 cm

EDV(MOD-sp4) 69.7 ml

EDV(sp4-el) 68.7 ml

LVAs ap4 15.6 cm\S\2

LVLs ap4 7.4 cm

ESV(MOD-sp4) 30.9 ml

ESV(sp4-el) 27.9 ml

EF(MOD-sp4) 55.7 %

EF(sp4-el) 59.4 %



LVAd ap2 24.8 cm\S\2

LVLd ap2 7.6 cm

EDV(MOD-sp2) 67.7 ml

EDV(sp2-el) 68.5 ml

LVAs ap2 16.9 cm\S\2

LVLs ap2 7.1 cm

ESV(MOD-sp2) 37.8 ml

ESV(sp2-el) 34.2 ml

EF(MOD-sp2) 44.1 %

EF(sp2-el) 50.0 %



LVLd %diff -9.63 %

EDV(MOD-bp) 70.0 ml

LVLs %diff -4.37 %

ESV(MOD-bp) 34.5 ml

EF(MOD-bp) 50.7 %



SV(MOD-sp4) 38.8 ml

SI(MOD-sp4) 22.9 ml/m\S\2





SV(MOD-sp2) 29.9 ml

SI(MOD-sp2) 17.6 ml/m\S\2



SV(MOD-bp) 35.5 ml

SI(MOD-bp) 21.0 ml/m\S\2



SV(sp4-el) 40.8 ml

SI(sp4-el) 24.1 ml/m\S\2



SV(sp2-el) 34.2 ml

SI(sp2-el) 20.2 ml/m\S\2







Doppler Measurements and Calculations

MV E max amy 75.7 cm/sec

MV A max amy 108.7 cm/sec



MV E/A 0.70 



MV P1/2t max amy 89.5 cm/sec

MV P1/2t 65.2 msec

MVA(P1/2t) 3.4 cm\S\2

MV dec slope 401.9 cm/sec\S\2

MV dec time 0.23 sec



Ao V2 max 112.9 cm/sec

Ao max PG 5.1 mmHg

Ao max PG (full) 1.2 mmHg

MARINE(V,A) 2.8 cm\S\2

MARINE(V,D) 2.8 cm\S\2





LV V1 max PG 3.9 mmHg



LV V1 max 98.5 cm/sec



PA V2 max 146.6 cm/sec

PA max PG 8.6 mmHg

## 2017-01-07 VITALS — HEART RATE: 72 BPM | OXYGEN SATURATION: 98 %

## 2017-01-07 VITALS — OXYGEN SATURATION: 98 % | HEART RATE: 73 BPM

## 2017-01-07 VITALS
HEART RATE: 78 BPM | TEMPERATURE: 97.52 F | SYSTOLIC BLOOD PRESSURE: 171 MMHG | OXYGEN SATURATION: 99 % | DIASTOLIC BLOOD PRESSURE: 74 MMHG

## 2017-01-07 VITALS
TEMPERATURE: 97.52 F | OXYGEN SATURATION: 96 % | HEART RATE: 79 BPM | DIASTOLIC BLOOD PRESSURE: 71 MMHG | SYSTOLIC BLOOD PRESSURE: 162 MMHG

## 2017-01-07 VITALS
HEART RATE: 86 BPM | DIASTOLIC BLOOD PRESSURE: 53 MMHG | TEMPERATURE: 97.88 F | OXYGEN SATURATION: 97 % | SYSTOLIC BLOOD PRESSURE: 134 MMHG

## 2017-01-07 VITALS
HEART RATE: 68 BPM | TEMPERATURE: 97.88 F | OXYGEN SATURATION: 97 % | SYSTOLIC BLOOD PRESSURE: 161 MMHG | DIASTOLIC BLOOD PRESSURE: 66 MMHG

## 2017-01-07 VITALS
DIASTOLIC BLOOD PRESSURE: 87 MMHG | HEART RATE: 79 BPM | OXYGEN SATURATION: 97 % | SYSTOLIC BLOOD PRESSURE: 187 MMHG | TEMPERATURE: 97.88 F

## 2017-01-07 VITALS
OXYGEN SATURATION: 95 % | DIASTOLIC BLOOD PRESSURE: 82 MMHG | TEMPERATURE: 97.52 F | HEART RATE: 83 BPM | SYSTOLIC BLOOD PRESSURE: 187 MMHG

## 2017-01-07 LAB
ANION GAP SERPL CALC-SCNC: 8 MMOL/L (ref 3–11)
BASOPHILS # BLD: 0 K/UL (ref 0–0.2)
BASOPHILS NFR BLD: 0 %
BUN SERPL-MCNC: 37 MG/DL (ref 7–18)
BUN/CREAT SERPL: 22.9 (ref 10–20)
CALCIUM SERPL-MCNC: 8.4 MG/DL (ref 8.5–10.1)
CHLORIDE SERPL-SCNC: 102 MMOL/L (ref 98–107)
CO2 SERPL-SCNC: 30 MMOL/L (ref 21–32)
COMPLETE: YES
CREAT CL PREDICTED SERPL C-G-VRATE: 35.5 ML/MIN
CREAT SERPL-MCNC: 1.6 MG/DL (ref 0.6–1.4)
EOSINOPHIL NFR BLD AUTO: 159 K/UL (ref 130–400)
GLUCOSE SERPL-MCNC: 135 MG/DL (ref 70–99)
HCT VFR BLD CALC: 35.9 % (ref 42–52)
IG%: 0.5 %
IMM GRANULOCYTES NFR BLD AUTO: 5.8 %
LYMPHOCYTES # BLD: 0.89 K/UL (ref 1.2–3.4)
MAGNESIUM SERPL-MCNC: 2.1 MG/DL (ref 1.8–2.4)
MCH RBC QN AUTO: 28.7 PG (ref 25–34)
MCHC RBC AUTO-ENTMCNC: 32.6 G/DL (ref 32–36)
MCV RBC AUTO: 88 FL (ref 80–100)
MONOCYTES NFR BLD: 4.5 %
NEUTROPHILS # BLD AUTO: 0 %
NEUTROPHILS NFR BLD AUTO: 89.2 %
PMV BLD AUTO: 10.4 FL (ref 7.4–10.4)
POTASSIUM SERPL-SCNC: 3.6 MMOL/L (ref 3.5–5.1)
RBC # BLD AUTO: 4.08 M/UL (ref 4.7–6.1)
SODIUM SERPL-SCNC: 140 MMOL/L (ref 136–145)
WBC # BLD AUTO: 15.45 K/UL (ref 4.8–10.8)

## 2017-01-07 RX ADMIN — HEPARIN SODIUM SCH UNIT: 10000 INJECTION, SOLUTION INTRAVENOUS; SUBCUTANEOUS at 20:45

## 2017-01-07 RX ADMIN — IPRATROPIUM BROMIDE AND ALBUTEROL SCH PUFFS: 20; 100 SPRAY, METERED RESPIRATORY (INHALATION) at 17:58

## 2017-01-07 RX ADMIN — METHYLPREDNISOLONE SODIUM SUCCINATE SCH MLS/MIN: 1 INJECTION, POWDER, FOR SOLUTION INTRAMUSCULAR; INTRAVENOUS at 08:36

## 2017-01-07 RX ADMIN — Medication SCH MG: at 08:37

## 2017-01-07 RX ADMIN — IPRATROPIUM BROMIDE AND ALBUTEROL SCH PUFFS: 20; 100 SPRAY, METERED RESPIRATORY (INHALATION) at 20:43

## 2017-01-07 RX ADMIN — AZITHROMYCIN SCH MG: 250 TABLET, FILM COATED ORAL at 08:37

## 2017-01-07 RX ADMIN — METHYLPREDNISOLONE SODIUM SUCCINATE SCH MLS/MIN: 1 INJECTION, POWDER, FOR SOLUTION INTRAMUSCULAR; INTRAVENOUS at 15:56

## 2017-01-07 RX ADMIN — LACTOBACILLUS TAB SCH TAB: TAB at 08:36

## 2017-01-07 RX ADMIN — PANTOPRAZOLE SCH MG: 40 TABLET, DELAYED RELEASE ORAL at 08:37

## 2017-01-07 RX ADMIN — IPRATROPIUM BROMIDE AND ALBUTEROL SULFATE SCH ML: .5; 3 SOLUTION RESPIRATORY (INHALATION) at 11:42

## 2017-01-07 RX ADMIN — BUDESONIDE AND FORMOTEROL FUMARATE DIHYDRATE SCH PUFFS: 160; 4.5 AEROSOL RESPIRATORY (INHALATION) at 08:39

## 2017-01-07 RX ADMIN — CARVEDILOL SCH MG: 12.5 TABLET, FILM COATED ORAL at 08:36

## 2017-01-07 RX ADMIN — LORAZEPAM PRN MG: 1 TABLET ORAL at 14:02

## 2017-01-07 RX ADMIN — AMLODIPINE BESYLATE SCH MG: 5 TABLET ORAL at 08:37

## 2017-01-07 RX ADMIN — CARVEDILOL SCH MG: 12.5 TABLET, FILM COATED ORAL at 20:43

## 2017-01-07 RX ADMIN — CITALOPRAM HYDROBROMIDE SCH MG: 20 TABLET ORAL at 08:37

## 2017-01-07 RX ADMIN — IPRATROPIUM BROMIDE AND ALBUTEROL SULFATE SCH ML: .5; 3 SOLUTION RESPIRATORY (INHALATION) at 07:58

## 2017-01-07 RX ADMIN — BUDESONIDE AND FORMOTEROL FUMARATE DIHYDRATE SCH PUFFS: 160; 4.5 AEROSOL RESPIRATORY (INHALATION) at 21:00

## 2017-01-07 RX ADMIN — HEPARIN SODIUM SCH UNIT: 10000 INJECTION, SOLUTION INTRAVENOUS; SUBCUTANEOUS at 08:49

## 2017-01-07 RX ADMIN — LORAZEPAM PRN MG: 1 TABLET ORAL at 21:00

## 2017-01-07 RX ADMIN — METHYLPREDNISOLONE SODIUM SUCCINATE SCH MLS/MIN: 1 INJECTION, POWDER, FOR SOLUTION INTRAMUSCULAR; INTRAVENOUS at 23:21

## 2017-01-07 RX ADMIN — ATORVASTATIN CALCIUM SCH MG: 40 TABLET, FILM COATED ORAL at 08:37

## 2017-01-07 RX ADMIN — METHYLPREDNISOLONE SODIUM SUCCINATE SCH MLS/MIN: 1 INJECTION, POWDER, FOR SOLUTION INTRAMUSCULAR; INTRAVENOUS at 00:23

## 2017-01-07 RX ADMIN — LACTOBACILLUS TAB SCH TAB: TAB at 17:58

## 2017-01-07 RX ADMIN — TAMSULOSIN HYDROCHLORIDE SCH MG: 0.4 CAPSULE ORAL at 08:37

## 2017-01-07 NOTE — PROGRESS NOTE
Internal Med Progress Note


Date of Service:


Jan 7, 2017.


Provider Documentation:





SUBJECTIVE:





eating dinner


afebrile


sob and cough improving


say ambulated ok








OBJECTIVE:





Vital Signs-as noted below





Exam:


General-alert and oriented x 3


ENT-normal hearing


Neck-no neck masses


Lungs-cta b/l no  rhonchi and wheezing


Heart-s1 and s2 heard regular rate and rhythm, no murmurs


Abdomen-soft bowel sounds present non tender no distension 


Extremities-no edema no erythema 


Neuro-alert and awake


moves extremities





Lab data as noted below.


ASSESSMENT & PLAN:


This is a 71-year-old male who presents with chronic


obstructive pulmonary disease exacerbation.


1.  Chronic obstructive pulmonary disease exacerbation.  Second admission in


the last few weeks.  Monitor in tele floor,start him on IV Solu-Medrol


40 mg p.o. t.i.d., DuoNebs q. 6 hours around the clock and q. 2 hours p.r.n.


and also p.o. azithromycin and monitored on tele floor.  We will check the


Flu test negative.Improving. Will Continue same for now.





2.  History of recent syncope ,echo unremarkable. No more episodes.





3.  History of gastroesophageal reflux disease.  Continue proton pump


inhibitor.





4.  History of hypertension.  Continue amlodipine and Coreg.  Iv hydralazine 

prn .Will monitor


the blood pressure.





5.  History of depression and  anxiety. Celexa and Ativan p.r.n.  





6. BPH. continue Flomax.





6.  Deep vein thrombosis prophylaxis, sequential compression devices and


heparin subQ.








DISPOSITION


pt/ot


ambulate in hallway


possible d/c on Monday


Vital Signs:











  Date Time  Temp Pulse Resp B/P Pulse Ox O2 Delivery O2 Flow Rate FiO2


 


1/7/17 16:04 36.6 86 20 134/53 97 Nasal Cannula 2.5 


 


1/7/17 16:00      Nasal Cannula 2.0 


 


1/7/17 12:15 36.6 68 18 161/66 97 Nasal Cannula 2.5 


 


1/7/17 11:43  72 14  98 Nasal Cannula 2.0 


 


1/7/17 11:22      Nasal Cannula 2.0 


 


1/7/17 07:58  73 16  98 Nasal Cannula 2.0 


 


1/7/17 07:48      Room Air  


 


1/7/17 07:48 36.4 83 20 187/82 95 Nasal Cannula 2.5 


 


1/7/17 04:00      Nasal Cannula 2.0 


 


1/7/17 04:00 36.4 79 20 162/71 96  2.5 


 


1/7/17 00:31 36.6 79 20 187/87 97  2.0 


 


1/7/17 00:00      Nasal Cannula 2.0 


 


1/6/17 20:00      Nasal Cannula 2.0 


 


1/6/17 19:47 36.5 85 19 176/75 100 Nasal Cannula 2.0 


 


1/6/17 18:44  74 18  98 Nasal Cannula 2.0 








Lab Results:





Results Past 24 Hours








Test


  1/6/17


19:58 1/7/17


05:30 Range/Units


 


 


Urine Color YELLOW   


 


Urine Appearance CLEAR  CLEAR  


 


Urine pH 5.0  4.5-7.5  


 


Urine Specific Gravity 1.016  1.000-1.030  


 


Urine Protein 1+  NEG  


 


Urine Glucose (UA) NEG  NEG  


 


Urine Ketones NEG  NEG  


 


Urine Occult Blood NEG  NEG  


 


Urine Nitrite NEG  NEG  


 


Urine Bilirubin NEG  NEG  


 


Urine Urobilinogen NEG  NEG  


 


Urine Leukocyte Esterase NEG  NEG  


 


Urine WBC (Auto) 1-5  0-5  /hpf


 


Urine RBC (Auto) 0-4  0-4  /hpf


 


Urine Hyaline Casts (Auto) 1-5  0-5  /lpf


 


Urine Epithelial Cells (Auto) 10-20  0-5  /lpf


 


Urine Bacteria (Auto) NEG  NEG  


 


White Blood Count  15.45 4.8-10.8  K/uL


 


Red Blood Count  4.08 4.7-6.1  M/uL


 


Hemoglobin  11.7 14.0-18.0  g/dL


 


Hematocrit  35.9 42-52  %


 


Mean Corpuscular Volume  88.0   fL


 


Mean Corpuscular Hemoglobin  28.7 25-34  pg


 


Mean Corpuscular Hemoglobin


Concent 


  32.6


  32-36  g/dl


 


 


Platelet Count  159 130-400  K/uL


 


Mean Platelet Volume  10.4 7.4-10.4  fL


 


Neutrophils (%) (Auto)  89.2  %


 


Lymphocytes (%) (Auto)  5.8  %


 


Monocytes (%) (Auto)  4.5  %


 


Eosinophils (%) (Auto)  0.0  %


 


Basophils (%) (Auto)  0.0  %


 


Neutrophils # (Auto)  13.78 1.4-6.5  K/uL


 


Lymphocytes # (Auto)  0.89 1.2-3.4  K/uL


 


Monocytes # (Auto)  0.70 0.11-0.59  K/uL


 


Eosinophils # (Auto)  0.00 0-0.5  K/uL


 


Basophils # (Auto)  0.00 0-0.2  K/uL


 


RDW Standard Deviation  43.8 36.4-46.3  fL


 


RDW Coefficient of Variation  13.7 11.5-14.5  %


 


Immature Granulocyte % (Auto)  0.5  %


 


Immature Granulocyte # (Auto)  0.08 0.00-0.02  K/uL


 


Sodium Level  140 136-145  mmol/L


 


Potassium Level  3.6 3.5-5.1  mmol/L


 


Chloride Level  102   mmol/L


 


Carbon Dioxide Level  30 21-32  mmol/L


 


Anion Gap  8.0 3-11  mmol/L


 


Blood Urea Nitrogen  37 7-18  mg/dl


 


Creatinine


  


  1.60


  0.60-1.40


mg/dl


 


Est Creatinine Clear Calc


Drug Dose 


  35.5


   ml/min


 


 


Estimated GFR (


American) 


  49.5


   


 


 


Estimated GFR (Non-


American 


  42.7


   


 


 


BUN/Creatinine Ratio  22.9 10-20  


 


Random Glucose  135 70-99  mg/dl


 


Calcium Level  8.4 8.5-10.1  mg/dl


 


Magnesium Level  2.1 1.8-2.4  mg/dl

## 2017-01-08 VITALS
HEART RATE: 83 BPM | DIASTOLIC BLOOD PRESSURE: 81 MMHG | OXYGEN SATURATION: 96 % | SYSTOLIC BLOOD PRESSURE: 191 MMHG | TEMPERATURE: 97.52 F

## 2017-01-08 VITALS
HEART RATE: 55 BPM | SYSTOLIC BLOOD PRESSURE: 170 MMHG | TEMPERATURE: 98.06 F | OXYGEN SATURATION: 99 % | DIASTOLIC BLOOD PRESSURE: 67 MMHG

## 2017-01-08 VITALS
TEMPERATURE: 98.42 F | OXYGEN SATURATION: 97 % | HEART RATE: 89 BPM | SYSTOLIC BLOOD PRESSURE: 177 MMHG | DIASTOLIC BLOOD PRESSURE: 65 MMHG

## 2017-01-08 VITALS
SYSTOLIC BLOOD PRESSURE: 171 MMHG | DIASTOLIC BLOOD PRESSURE: 71 MMHG | TEMPERATURE: 97.88 F | OXYGEN SATURATION: 2 % | HEART RATE: 81 BPM

## 2017-01-08 VITALS
DIASTOLIC BLOOD PRESSURE: 77 MMHG | SYSTOLIC BLOOD PRESSURE: 178 MMHG | HEART RATE: 75 BPM | OXYGEN SATURATION: 99 % | TEMPERATURE: 97.88 F

## 2017-01-08 VITALS
TEMPERATURE: 97.7 F | HEART RATE: 90 BPM | SYSTOLIC BLOOD PRESSURE: 185 MMHG | DIASTOLIC BLOOD PRESSURE: 79 MMHG | OXYGEN SATURATION: 93 %

## 2017-01-08 VITALS — HEART RATE: 76 BPM | OXYGEN SATURATION: 98 %

## 2017-01-08 VITALS
HEART RATE: 81 BPM | DIASTOLIC BLOOD PRESSURE: 73 MMHG | TEMPERATURE: 97.52 F | SYSTOLIC BLOOD PRESSURE: 171 MMHG | OXYGEN SATURATION: 99 %

## 2017-01-08 VITALS — SYSTOLIC BLOOD PRESSURE: 174 MMHG | DIASTOLIC BLOOD PRESSURE: 73 MMHG

## 2017-01-08 LAB
ANION GAP SERPL CALC-SCNC: 9 MMOL/L (ref 3–11)
BASOPHILS # BLD: 0.01 K/UL (ref 0–0.2)
BASOPHILS NFR BLD: 0.1 %
BUN SERPL-MCNC: 48 MG/DL (ref 7–18)
BUN/CREAT SERPL: 32.2 (ref 10–20)
CALCIUM SERPL-MCNC: 7.6 MG/DL (ref 8.5–10.1)
CHLORIDE SERPL-SCNC: 106 MMOL/L (ref 98–107)
CO2 SERPL-SCNC: 29 MMOL/L (ref 21–32)
COMPLETE: YES
CREAT CL PREDICTED SERPL C-G-VRATE: 38.3 ML/MIN
CREAT SERPL-MCNC: 1.5 MG/DL (ref 0.6–1.4)
EOSINOPHIL NFR BLD AUTO: 139 K/UL (ref 130–400)
GLUCOSE SERPL-MCNC: 132 MG/DL (ref 70–99)
HCT VFR BLD CALC: 34.4 % (ref 42–52)
IG%: 0.4 %
IMM GRANULOCYTES NFR BLD AUTO: 6.2 %
LYMPHOCYTES # BLD: 0.89 K/UL (ref 1.2–3.4)
MAGNESIUM SERPL-MCNC: 2.1 MG/DL (ref 1.8–2.4)
MCH RBC QN AUTO: 29 PG (ref 25–34)
MCHC RBC AUTO-ENTMCNC: 32.6 G/DL (ref 32–36)
MCV RBC AUTO: 89.1 FL (ref 80–100)
MONOCYTES NFR BLD: 4.6 %
NEUTROPHILS # BLD AUTO: 0 %
NEUTROPHILS NFR BLD AUTO: 88.7 %
PMV BLD AUTO: 10.2 FL (ref 7.4–10.4)
POTASSIUM SERPL-SCNC: 3.5 MMOL/L (ref 3.5–5.1)
RBC # BLD AUTO: 3.86 M/UL (ref 4.7–6.1)
SODIUM SERPL-SCNC: 144 MMOL/L (ref 136–145)
WBC # BLD AUTO: 14.25 K/UL (ref 4.8–10.8)

## 2017-01-08 RX ADMIN — ATORVASTATIN CALCIUM SCH MG: 40 TABLET, FILM COATED ORAL at 08:23

## 2017-01-08 RX ADMIN — METHYLPREDNISOLONE SODIUM SUCCINATE SCH MLS/MIN: 1 INJECTION, POWDER, FOR SOLUTION INTRAMUSCULAR; INTRAVENOUS at 23:42

## 2017-01-08 RX ADMIN — LACTOBACILLUS TAB SCH TAB: TAB at 08:24

## 2017-01-08 RX ADMIN — CARVEDILOL SCH MG: 12.5 TABLET, FILM COATED ORAL at 08:38

## 2017-01-08 RX ADMIN — LORAZEPAM PRN MG: 1 TABLET ORAL at 14:34

## 2017-01-08 RX ADMIN — METHYLPREDNISOLONE SODIUM SUCCINATE SCH MLS/MIN: 1 INJECTION, POWDER, FOR SOLUTION INTRAMUSCULAR; INTRAVENOUS at 08:23

## 2017-01-08 RX ADMIN — LORAZEPAM PRN MG: 1 TABLET ORAL at 19:10

## 2017-01-08 RX ADMIN — CITALOPRAM HYDROBROMIDE SCH MG: 20 TABLET ORAL at 08:38

## 2017-01-08 RX ADMIN — PANTOPRAZOLE SCH MG: 40 TABLET, DELAYED RELEASE ORAL at 08:24

## 2017-01-08 RX ADMIN — IPRATROPIUM BROMIDE AND ALBUTEROL SCH PUFFS: 20; 100 SPRAY, METERED RESPIRATORY (INHALATION) at 21:07

## 2017-01-08 RX ADMIN — AZITHROMYCIN SCH MG: 250 TABLET, FILM COATED ORAL at 08:24

## 2017-01-08 RX ADMIN — METHYLPREDNISOLONE SODIUM SUCCINATE SCH MLS/MIN: 1 INJECTION, POWDER, FOR SOLUTION INTRAMUSCULAR; INTRAVENOUS at 16:18

## 2017-01-08 RX ADMIN — BUDESONIDE AND FORMOTEROL FUMARATE DIHYDRATE SCH PUFFS: 160; 4.5 AEROSOL RESPIRATORY (INHALATION) at 08:22

## 2017-01-08 RX ADMIN — BUDESONIDE AND FORMOTEROL FUMARATE DIHYDRATE SCH PUFFS: 160; 4.5 AEROSOL RESPIRATORY (INHALATION) at 21:07

## 2017-01-08 RX ADMIN — IPRATROPIUM BROMIDE AND ALBUTEROL SCH PUFFS: 20; 100 SPRAY, METERED RESPIRATORY (INHALATION) at 12:49

## 2017-01-08 RX ADMIN — HEPARIN SODIUM SCH UNIT: 10000 INJECTION, SOLUTION INTRAVENOUS; SUBCUTANEOUS at 08:25

## 2017-01-08 RX ADMIN — IPRATROPIUM BROMIDE AND ALBUTEROL SCH PUFFS: 20; 100 SPRAY, METERED RESPIRATORY (INHALATION) at 16:18

## 2017-01-08 RX ADMIN — IPRATROPIUM BROMIDE AND ALBUTEROL SCH PUFFS: 20; 100 SPRAY, METERED RESPIRATORY (INHALATION) at 08:22

## 2017-01-08 RX ADMIN — LACTOBACILLUS TAB SCH TAB: TAB at 16:49

## 2017-01-08 RX ADMIN — CARVEDILOL SCH MG: 12.5 TABLET, FILM COATED ORAL at 21:07

## 2017-01-08 RX ADMIN — HEPARIN SODIUM SCH UNIT: 10000 INJECTION, SOLUTION INTRAVENOUS; SUBCUTANEOUS at 21:15

## 2017-01-08 RX ADMIN — Medication SCH MG: at 08:23

## 2017-01-08 RX ADMIN — TAMSULOSIN HYDROCHLORIDE SCH MG: 0.4 CAPSULE ORAL at 08:24

## 2017-01-08 RX ADMIN — AMLODIPINE BESYLATE SCH MG: 5 TABLET ORAL at 08:23

## 2017-01-08 NOTE — PROGRESS NOTE
Internal Med Progress Note


Date of Service:


Jan 8, 2017.


Provider Documentation:





SUBJECTIVE:





resting comfortably


sob and cough better


eating ok


denies any complaints


want to go in am








OBJECTIVE:





Vital Signs-as noted below





Exam:


General-alert and oriented x 3


ENT-normal hearing


Neck-no neck masses


Lungs-cta b/l no  rhonchi and wheezing


Heart-s1 and s2 heard regular rate and rhythm, no murmurs


Abdomen-soft bowel sounds present non tender no distension 


Extremities-no edema no erythema 


Neuro-alert and awake


moves extremities





Lab data as noted below.


ASSESSMENT & PLAN:


This is a 71-year-old male who presents with chronic


obstructive pulmonary disease exacerbation.


1.  Chronic obstructive pulmonary disease exacerbation.  Second admission in


the last few weeks.  Monitor in tele floor,start him on IV Solu-Medrol


40 mg p.o. t.i.d., DuoNebs q. 6 hours around the clock and q. 2 hours p.r.n.


and also p.o. azithromycin and monitored on tele floor.  


Flu test negative.Improving. Will Continue same for now.


will star on po steroids in am and possible discharge if stable. Two step prior 

to discharge





2.  History of recent syncope ,echo unremarkable. No more episodes.





3.  History of gastroesophageal reflux disease.  Continue proton pump


inhibitor.





4.  History of hypertension.  Continue amlodipine and Coreg.  Iv hydralazine 

prn .Will monitor


the blood pressure.





5.  History of depression and  anxiety. Celexa and Ativan p.r.n.  





6. BPH. continue Flomax.





6.  Deep vein thrombosis prophylaxis, sequential compression devices and


heparin subQ.








DISPOSITION


pt/ot


ambulate in hallway


possible d/c on Monday


Vital Signs:











  Date Time  Temp Pulse Resp B/P Pulse Ox O2 Delivery O2 Flow Rate FiO2


 


1/8/17 17:27 36.9 89 22 177/65 97 Nasal Cannula 2.5 


 


1/8/17 16:00      Nasal Cannula 2.0 


 


1/8/17 13:34  76 16  98 Nasal Cannula 4.0 


 


1/8/17 11:51 36.7 55 20 170/67 99 Nasal Cannula 2.0 


 


1/8/17 11:14      Nasal Cannula 2.0 


 


1/8/17 07:31      Nasal Cannula 2.0 


 


1/8/17 07:14 36.6 75 20 178/77 99 Nasal Cannula 2.0 


 


1/8/17 05:17 36.6 81 18 171/71 2   


 


1/8/17 04:00      Nasal Cannula 2.0 


 


1/8/17 00:47    174/73    


 


1/8/17 00:14 36.4 83 18 191/81 96  2.5 


 


1/8/17 00:00      Nasal Cannula 2.0 


 


1/7/17 20:27 36.4 78 18 171/74 99 Room Air  


 


1/7/17 20:00      Nasal Cannula 2.0 








Lab Results:





Results Past 24 Hours








Test


  1/8/17


05:25 Range/Units


 


 


White Blood Count 14.25 4.8-10.8  K/uL


 


Red Blood Count 3.86 4.7-6.1  M/uL


 


Hemoglobin 11.2 14.0-18.0  g/dL


 


Hematocrit 34.4 42-52  %


 


Mean Corpuscular Volume 89.1   fL


 


Mean Corpuscular Hemoglobin 29.0 25-34  pg


 


Mean Corpuscular Hemoglobin


Concent 32.6


  32-36  g/dl


 


 


Platelet Count 139 130-400  K/uL


 


Mean Platelet Volume 10.2 7.4-10.4  fL


 


Neutrophils (%) (Auto) 88.7  %


 


Lymphocytes (%) (Auto) 6.2  %


 


Monocytes (%) (Auto) 4.6  %


 


Eosinophils (%) (Auto) 0.0  %


 


Basophils (%) (Auto) 0.1  %


 


Neutrophils # (Auto) 12.65 1.4-6.5  K/uL


 


Lymphocytes # (Auto) 0.89 1.2-3.4  K/uL


 


Monocytes # (Auto) 0.65 0.11-0.59  K/uL


 


Eosinophils # (Auto) 0.00 0-0.5  K/uL


 


Basophils # (Auto) 0.01 0-0.2  K/uL


 


RDW Standard Deviation 45.7 36.4-46.3  fL


 


RDW Coefficient of Variation 13.9 11.5-14.5  %


 


Immature Granulocyte % (Auto) 0.4  %


 


Immature Granulocyte # (Auto) 0.05 0.00-0.02  K/uL


 


Sodium Level 144 136-145  mmol/L


 


Potassium Level 3.5 3.5-5.1  mmol/L


 


Chloride Level 106   mmol/L


 


Carbon Dioxide Level 29 21-32  mmol/L


 


Anion Gap 9.0 3-11  mmol/L


 


Blood Urea Nitrogen 48 7-18  mg/dl


 


Creatinine


  1.50


  0.60-1.40


mg/dl


 


Est Creatinine Clear Calc


Drug Dose 38.3


   ml/min


 


 


Estimated GFR (


American) 53.5


   


 


 


Estimated GFR (Non-


American 46.2


   


 


 


BUN/Creatinine Ratio 32.2 10-20  


 


Random Glucose 132 70-99  mg/dl


 


Calcium Level 7.6 8.5-10.1  mg/dl


 


Magnesium Level 2.1 1.8-2.4  mg/dl

## 2017-01-09 VITALS
HEART RATE: 76 BPM | DIASTOLIC BLOOD PRESSURE: 68 MMHG | OXYGEN SATURATION: 98 % | SYSTOLIC BLOOD PRESSURE: 163 MMHG | TEMPERATURE: 97.7 F

## 2017-01-09 VITALS
OXYGEN SATURATION: 93 % | DIASTOLIC BLOOD PRESSURE: 65 MMHG | TEMPERATURE: 97.88 F | HEART RATE: 85 BPM | SYSTOLIC BLOOD PRESSURE: 169 MMHG

## 2017-01-09 VITALS
HEART RATE: 76 BPM | DIASTOLIC BLOOD PRESSURE: 68 MMHG | SYSTOLIC BLOOD PRESSURE: 163 MMHG | TEMPERATURE: 97.7 F | OXYGEN SATURATION: 98 %

## 2017-01-09 VITALS
HEART RATE: 74 BPM | TEMPERATURE: 97.88 F | OXYGEN SATURATION: 100 % | DIASTOLIC BLOOD PRESSURE: 70 MMHG | SYSTOLIC BLOOD PRESSURE: 185 MMHG

## 2017-01-09 VITALS — OXYGEN SATURATION: 99 %

## 2017-01-09 RX ADMIN — CITALOPRAM HYDROBROMIDE SCH MG: 20 TABLET ORAL at 07:33

## 2017-01-09 RX ADMIN — BUDESONIDE AND FORMOTEROL FUMARATE DIHYDRATE SCH PUFFS: 160; 4.5 AEROSOL RESPIRATORY (INHALATION) at 07:33

## 2017-01-09 RX ADMIN — METHYLPREDNISOLONE SODIUM SUCCINATE SCH MLS/MIN: 1 INJECTION, POWDER, FOR SOLUTION INTRAMUSCULAR; INTRAVENOUS at 07:32

## 2017-01-09 RX ADMIN — PANTOPRAZOLE SCH MG: 40 TABLET, DELAYED RELEASE ORAL at 07:34

## 2017-01-09 RX ADMIN — AMLODIPINE BESYLATE SCH MG: 5 TABLET ORAL at 07:34

## 2017-01-09 RX ADMIN — IPRATROPIUM BROMIDE AND ALBUTEROL SCH PUFFS: 20; 100 SPRAY, METERED RESPIRATORY (INHALATION) at 07:33

## 2017-01-09 RX ADMIN — CARVEDILOL SCH MG: 12.5 TABLET, FILM COATED ORAL at 07:35

## 2017-01-09 RX ADMIN — IPRATROPIUM BROMIDE AND ALBUTEROL SCH PUFFS: 20; 100 SPRAY, METERED RESPIRATORY (INHALATION) at 13:19

## 2017-01-09 RX ADMIN — AZITHROMYCIN SCH MG: 250 TABLET, FILM COATED ORAL at 07:34

## 2017-01-09 RX ADMIN — LACTOBACILLUS TAB SCH TAB: TAB at 07:33

## 2017-01-09 RX ADMIN — HEPARIN SODIUM SCH UNIT: 10000 INJECTION, SOLUTION INTRAVENOUS; SUBCUTANEOUS at 07:39

## 2017-01-09 RX ADMIN — Medication SCH MG: at 07:34

## 2017-01-09 RX ADMIN — ATORVASTATIN CALCIUM SCH MG: 40 TABLET, FILM COATED ORAL at 07:33

## 2017-01-09 RX ADMIN — TAMSULOSIN HYDROCHLORIDE SCH MG: 0.4 CAPSULE ORAL at 07:34

## 2017-01-09 NOTE — PROGRESS NOTE
Internal Med Progress Note


Date of Service:


Jan 9, 2017.


Provider Documentation:





SUBJECTIVE:





resting comfortably


sob and cough much better


ambulated fine


want to go home








OBJECTIVE:





Vital Signs-as noted below





Exam:


General-alert and oriented x 3


ENT-normal hearing


Neck-no neck masses


Lungs-cta b/l no  rhonchi and wheezing


Heart-s1 and s2 heard regular rate and rhythm, no murmurs


Abdomen-soft bowel sounds present non tender no distension 


Extremities-no edema no erythema 


Neuro-alert and awake


moves extremities





Lab data as noted below.


ASSESSMENT & PLAN:


This is a 71-year-old male who presents with chronic


obstructive pulmonary disease exacerbation.


1.  Chronic obstructive pulmonary disease exacerbation.  Second admission in


the last few weeks.  Monitor in tele floor,start him on IV Solu-Medrol


40 mg p.o. t.i.d., DuoNebs q. 6 hours around the clock and q. 2 hours p.r.n.


and also p.o. azithromycin and monitored on tele floor.  


Flu test negative.Improving. Will Continue same for now.


will star on po steroids in am and possible discharge if stable. Two step prior 

to discharge


discharged with home nebs and inhalers and prednisone taper





2.  History of recent syncope ,echo unremarkable. No more episodes.





3.  History of gastroesophageal reflux disease.  Continue proton pump


inhibitor.





4.  History of hypertension.  Continue amlodipine and Coreg.  Iv hydralazine 

prn .Will monitor


the blood pressure.





5.  History of depression and  anxiety. Celexa and Ativan p.r.n.  





6. BPH. continue Flomax.





discharged home


Vital Signs:











  Date Time  Temp Pulse Resp B/P Pulse Ox O2 Delivery O2 Flow Rate FiO2


 


1/9/17 12:53 36.5 76 18  98 Nasal Cannula  


 


1/9/17 11:40 36.5 76 18 163/68 98 Nasal Cannula 2.5 


 


1/9/17 08:00     93 Nasal Cannula 2.0 


 


1/9/17 08:00 36.6 85 20 169/65 93 Nasal Cannula 2.0 


 


1/9/17 04:05 36.6 74 20 185/70 100 Nasal Cannula 2.0 


 


1/9/17 04:00     99 Nasal Cannula 2.0 


 


1/9/17 00:00     99 Nasal Cannula 2.0 


 


1/8/17 23:55 36.4 81 18 171/73 99  2.0 


 


1/8/17 20:00      Nasal Cannula 2.0 


 


1/8/17 19:53 36.5 90 18 185/79 93  2.0

## 2017-01-09 NOTE — DISCHARGE SUMMARY
Discharge Summary


Admission Date:


Jan 5, 2017 at 18:16


Discharge Date:  Jan 9, 2017


Discharge Disposition:  Home


Principal Diagnosis:


COPD EXACERBATION


Secondary Diagnoses/Problems:


severe COPD, on oxygen at night, previous history of


smoking, hypertension, hyperlipidemia, chronic kidney disease stage III,


history of GERD, history of Pritchett esophagus, history of hiatal hernia,


history of bladder cance


Procedures:


ECHO:


 No change compared to previous study of 10/7/15.


  *  Normal LV chamber size with mild concentric LVH.


  *  Normal LV systolic function, EF 60-65%.


  *  No segmental left ventricular wall motion abnormalities are noted.


  *  Grade I diastolic dysfunction.


  *  No significant valvular pathology.


Medication Reconciliation


New Medications:  


Prednisone (Prednisone) 10 Mg Tab


60 MG PO UD, #42 TAB


PREDNISONE 60MG PO DAILY X 2 DAYS THEN


 PREDNISONE 50MG PO DAILY X 2 DAYS THEN


 PREDNISONE 40MG PO DAILY X 2 DAYS THEN


 PREDNISONE 30MG PO DAILY X 2 DAYS THEN


 PREDNISONE 20MG PO DAILY X 2 DAYS THEN


 PREDNISONE 10MG PO DAILY X 2 DAYS THEN STOP


 


Continued Medications:  


Albuterol (Proair Hfa)  Aers


1-2 PUFFS INH Q4-6HOURS PRN for Wheezing





Amlodipine Besylate (Amlodipine Besylate) 5 Mg Tab


5 MG PO DAILY, #30





Aspirin (Aspir-81) 81 Mg Tab


1 TAB PO DAILY for 90 Days, #90 TAB 3 Refills





Atorvastatin (Lipitor) 40 Mg Tab


40 MG PO DAILY, TAB





Budesonide/Formoterol Fumarate (Symbicort 160/4.5 Inhaler )  Aero


2 PUFFS INH BID





Carvedilol (Coreg) 12.5 Mg Tab


12.5 MG PO BID, TAB





Citalopram (Citalopram Hydrobromide) 20 Mg Tab


20 MG PO DAILY, #30





Folic Acid (Folic Acid) 1 Mg Tab


1 MG PO DAILY





Ipratropium-Albuterol (Duoneb) 3 Ml Nebu


3 ML PO QID





Lactobacillus Acidophilus (Lactinex)  Tab


2 TAB PO BID for 15 Days, TAB





Lorazepam (Lorazepam) 1 Mg Tab


1 MG PO BID PRN for Anxiety, #60





Omeprazole (Prilosec) 20 Mg Capcr


20 MG PO DAILY, CAP





Prednisone (Prednisone) 20 Mg Tab


PO UD, #15


RESQUE KIT, TAKE UD


Tamsulosin Hcl (Flomax) 0.4 Mg Cap


0.4 MG PO DAILY, CAP





 


Discontinued Medications:  


Cefuroxime Axetil (Cefuroxime Axetil) 250 Mg Tab


500 MG PO BID





Doxycycline Monohydrate (Monodox) 100 Mg Cap


100 MG PO BID, CAP





Prednisone Tab (Prednisone) 10 Mg Tab


60 MG PO UD, #43 TAB


PREDNSIONE 60MG PO DAILY X 2 DAYS THEN


 PREDNSIONE 50MG PO DAILY X 2 DAYS THEN


 PREDNSIONE 40MG PO DAILY X 2 DAYS THEN


 PREDNSIONE 30MG PO DAILY X 2 DAYS THEN


 PREDNSIONE 20MG PO DAILY X 2 DAYS THEN


 PREDNSIONE 10MG PO DAILY X 2 DAYS THEN


 PREDNSIONE 5MG PO DAILY X 2 DAYS AND STOP.








Admission Information


HPI (per Admitting provider):


This is a 71-year-old male with past medical


history significant for severe COPD, on oxygen at night, previous history of


smoking, hypertension, hyperlipidemia, chronic kidney disease stage III,


history of GERD, history of Pritchett esophagus, history of hiatal hernia,


history of bladder cancer who was recently in the hospital prior to [facility]


with severe COPD exacerbation and syncope, but he wanted to be discharged for


his [place] and the next day he did fine and got discharged with prednisone


taper and on p.o. antibiotics.  The patient says he did fine after that, he


has followed with family doctor also, but couple of days ago again he started


to have some cold-like symptoms and some sore throat and started to again


have shortness of breath and cough, which was not getting better, so he came


to the ER today.  Even after 1 hour of treatment, the patient is not back to


his baseline, so we are called for admission.  The patient denies any fever


or chills, but says whenever he gets short of breath has some chest heaviness. 


Denies any headaches, no dizziness, no nausea, no vomiting, no diarrhea, no


abdominal pain.  Resting comfortably and hemodynamically stable.


Physical Exam (per Admitting):


GENERAL:  The patient is of moderate build, not in distress.


VITAL SIGNS:  Temperature 36.7, pulse 74, respiratory rate 22, blood pressure


151/73, oxygen 95% on 3 liters.


HEENT:  No pallor, no icterus.  Pupils equal, round, and reactive to light.


NECK:  No JVD, no neck masses, no carotid bruits.


CARDIOVASCULAR:  S1, S2 heard, regular rate and rhythm, no murmur, no gallop.


RESPIRATORY SYSTEM:  Clear to auscultation bilaterally, diminished bilateral


breath sounds.  Occasional wheezing, no crackles.


ABDOMEN:  Soft, bowel sounds present.  Nontender.  No distention.


CENTRAL NERVOUS SYSTEM:  Cranial nerves II-XII grossly intact.  Nonfocal.


EXTREMITIES:  No edema, no erythema.





Hospital Course


This is a 71-year-old male who presents with chronic


obstructive pulmonary disease exacerbation.


1.  Chronic obstructive pulmonary disease exacerbation.  Second admission in


the last few weeks.  Monitor in tele floor,start him on IV Solu-Medrol


40 mg p.o. t.i.d., DuoNebs q. 6 hours around the clock and q. 2 hours p.r.n.


and also p.o. azithromycin and monitored on tele floor.  


Flu test negative.Improving. Will Continue same for now.


will star on po steroids in am and possible discharge if stable. Two step prior 

to discharge


discharged with home nebs and inhalers and prednisone taper





2.  History of recent syncope ,echo unremarkable. No more episodes.





3.  History of gastroesophageal reflux disease.  Continue proton pump


inhibitor.





4.  History of hypertension.  Continue amlodipine and Coreg.  Iv hydralazine 

prn .Will monitor


the blood pressure.





5.  History of depression and  anxiety. Celexa and Ativan p.r.n.  





6. BPH. continue Flomax.





discharged home


Total time spent on discharge = 35MINUTES


This includes examination of the patient, discharge planning, medication 

reconciliation, and communication with other providers.





Discharge Instructions


                                           Please take this sheet to every 

appointment for the next month


Discharge Instructions


Admission


Reason for Admission:  Copd Exacerbation





Discharge


Discharge Diagnosis / Problem:  COPD EXACERBATION





Discharge Goals


Goal(s):  Decrease discomfort, Improve function





Activity Recommendations


Activity Limitations:  resume your previous activity





.





Instructions / Follow-Up


Instructions / Follow-Up


FOLLOWUP WITH FAMILY DOCTOR  ON JAN 16TH AT 9:50AM





FOLLOWUP WITH PULMONARY IN 1-2 WEEKS





BLOOD PRESSURE FOLLOWUP WITH FAMILY DOCTOR





Current Hospital Diet


Patient's current hospital diet: AHA Diet (Heart Healthy)





Discharge Diet


Recommended Diet:  AHA Diet (Heart Healthy)





Pending Studies


Studies pending at discharge:  no





Laboratory Results





 Hemoglobin A1c








Test


  12/24/16


18:19 Range/Units


 


 


Estimated Average Glucose 103   mg/dl


 


Hemoglobin A1c 5.2  4.5-5.6  %











Medical Emergencies








.


Who to Call and When:





Medical Emergencies:  If at any time you feel your situation is an emergency, 

please call 911 immediately.





.





Non-Emergent Contact


Non-Emergency issues call your:  Primary Care Provider





.


.





"Provider Documentation" section prepared by Kuldip Dumont.





VTE Core Measure


Inpt VTE Proph given/why not?:  Unfractionated heparin SQ

## 2017-08-10 ENCOUNTER — HOSPITAL ENCOUNTER (INPATIENT)
Dept: HOSPITAL 45 - C.EDB | Age: 72
LOS: 4 days | Discharge: HOME HEALTH | DRG: 208 | End: 2017-08-14
Attending: HOSPITALIST | Admitting: HOSPITALIST
Payer: COMMERCIAL

## 2017-08-10 VITALS
DIASTOLIC BLOOD PRESSURE: 93 MMHG | SYSTOLIC BLOOD PRESSURE: 197 MMHG | OXYGEN SATURATION: 100 % | TEMPERATURE: 98.42 F | HEART RATE: 80 BPM

## 2017-08-10 VITALS
WEIGHT: 149.03 LBS | HEIGHT: 68 IN | BODY MASS INDEX: 22.59 KG/M2 | HEIGHT: 68 IN | BODY MASS INDEX: 22.59 KG/M2 | WEIGHT: 149.03 LBS

## 2017-08-10 VITALS — OXYGEN SATURATION: 100 %

## 2017-08-10 DIAGNOSIS — Z79.899: ICD-10-CM

## 2017-08-10 DIAGNOSIS — E78.5: ICD-10-CM

## 2017-08-10 DIAGNOSIS — Z79.52: ICD-10-CM

## 2017-08-10 DIAGNOSIS — N18.3: ICD-10-CM

## 2017-08-10 DIAGNOSIS — F17.200: ICD-10-CM

## 2017-08-10 DIAGNOSIS — F41.9: ICD-10-CM

## 2017-08-10 DIAGNOSIS — Z99.81: ICD-10-CM

## 2017-08-10 DIAGNOSIS — J44.1: ICD-10-CM

## 2017-08-10 DIAGNOSIS — J96.01: Primary | ICD-10-CM

## 2017-08-10 DIAGNOSIS — J96.02: ICD-10-CM

## 2017-08-10 DIAGNOSIS — N17.9: ICD-10-CM

## 2017-08-10 DIAGNOSIS — J18.9: ICD-10-CM

## 2017-08-10 DIAGNOSIS — I12.9: ICD-10-CM

## 2017-08-10 DIAGNOSIS — K22.70: ICD-10-CM

## 2017-08-10 DIAGNOSIS — E87.2: ICD-10-CM

## 2017-08-10 DIAGNOSIS — K21.9: ICD-10-CM

## 2017-08-10 LAB
ALBUMIN/GLOB SERPL: 0.9 {RATIO} (ref 0.9–2)
ALP SERPL-CCNC: 106 U/L (ref 45–117)
ALT SERPL-CCNC: 37 U/L (ref 12–78)
ANION GAP SERPL CALC-SCNC: 8 MMOL/L (ref 3–11)
AST SERPL-CCNC: 51 U/L (ref 15–37)
BASE EXCESS STD BLDA CALC-SCNC: -1 MEQ/L (ref -9–1.8)
BASOPHILS # BLD: 0.02 K/UL (ref 0–0.2)
BASOPHILS NFR BLD: 0.2 %
BUN SERPL-MCNC: 23 MG/DL (ref 7–18)
BUN/CREAT SERPL: 12.1 (ref 10–20)
CALCIUM SERPL-MCNC: 8.4 MG/DL (ref 8.5–10.1)
CHLORIDE SERPL-SCNC: 109 MMOL/L (ref 98–107)
CO2 SERPL-SCNC: 27 MMOL/L (ref 21–32)
COMPLETE: YES
CREAT CL PREDICTED SERPL C-G-VRATE: 34.7 ML/MIN
CREAT SERPL-MCNC: 1.9 MG/DL (ref 0.6–1.4)
EOSINOPHIL NFR BLD AUTO: 237 K/UL (ref 130–400)
GLOBULIN SER-MCNC: 3.4 GM/DL (ref 2.5–4)
GLUCOSE SERPL-MCNC: 78 MG/DL (ref 70–99)
HCT VFR BLD CALC: 40.4 % (ref 42–52)
IG%: 1.1 %
IMM GRANULOCYTES NFR BLD AUTO: 25.5 %
INR PPP: 1 (ref 0.9–1.1)
ISTAT ARTERIAL BLD GAS O2 SAT: 100 % (ref 90–95)
ISTAT ARTERIAL BLOOD GAS HCO3: 27 MEQ/L (ref 19–24)
ISTAT ARTERIAL BLOOD GAS PCO2: 69 MMHG (ref 35–46)
ISTAT ARTERIAL BLOOD GAS PH: 7.2 (ref 7.35–7.45)
ISTAT ARTERIAL BLOOD GAS PO2: > 420 MMHG (ref 80–95)
ISTAT CARBON DIOXIDE: 29 MEQ/L (ref 24–31)
LYMPHOCYTES # BLD: 3.27 K/UL (ref 1.2–3.4)
MCH RBC QN AUTO: 28.8 PG (ref 25–34)
MCHC RBC AUTO-ENTMCNC: 31.7 G/DL (ref 32–36)
MCV RBC AUTO: 91 FL (ref 80–100)
MONOCYTES NFR BLD: 7.9 %
NEUTROPHILS # BLD AUTO: 1.5 %
NEUTROPHILS NFR BLD AUTO: 63.8 %
PMV BLD AUTO: 9.8 FL (ref 7.4–10.4)
POTASSIUM SERPL-SCNC: 4.5 MMOL/L (ref 3.5–5.1)
PROTHROMBIN TIME: 10.3 SECONDS (ref 9–12)
RBC # BLD AUTO: 4.44 M/UL (ref 4.7–6.1)
SODIUM SERPL-SCNC: 144 MMOL/L (ref 136–145)
WBC # BLD AUTO: 12.8 K/UL (ref 4.8–10.8)

## 2017-08-10 PROCEDURE — 5A1935Z RESPIRATORY VENTILATION, LESS THAN 24 CONSECUTIVE HOURS: ICD-10-PCS | Performed by: INTERNAL MEDICINE

## 2017-08-10 RX ADMIN — SODIUM CHLORIDE AND POTASSIUM CHLORIDE SCH MLS/HR: 9; 1.49 INJECTION, SOLUTION INTRAVENOUS at 22:05

## 2017-08-10 NOTE — HISTORY & PHYSICAL EXAMINATION
DATE OF ADMISSION:  08/10/2017

 

PRIMARY CARE PHYSICIAN:  Raul Knutson DO

 

CHIEF COMPLAINT:  Sudden shortness of breath with unresponsiveness for about

10 minutes.

 

HISTORY OF PRESENT COMPLAINT:  He is a 71-year-old male with significant past

medical history including severe oxygen dependent COPD with ongoing smoking,

history of Pritchett esophagus, hiatal hernia, hypertension, hyperlipidemia,

chronic kidney disease and anxiety stress.  Apparently, has had his dinner

today and following dinner he ate a candy and then was going to his room for

rest.  About 15 minutes later he called his wife and said that he cannot

breath.  His wife checked oxygen which was 81 on oxygen, and also gave him

inhaler, but after that his saturation did not improve, the condition got

worse and he became unresponsive.  EMS was called.  He was out for about 10

minutes and his saturation went down to 30s.  From that point, he was

intubated and he was brought in to the Emergency Room.

 

Further asking question to the wife; no history of fever, chills, or rigors. 

No recent cough or phlegm.  He has had pneumonia about 1 month ago and got

treatment for that and is supposed to have an x-ray as an outpatient

recently.  He denies to have any abdominal pain, nausea or vomiting, any

chest pain or palpitation, any numbness or tingling in the extremities, and

no definite weakness involving any side before the incident.  In the

Emergency Room, he is intubated.  He is hemodynamically stable, with x-ray

showing bibasilar atelectasis.  White count 12,000 and his BNP is elevated at

4654 but chest x-ray is not showing any overt pulmonary edema.  EKG

unremarkable and arterial blood gas showed to be acidotic with pH of 7.20,

pCO2 of 69 and pO2 420.  From that point, he was admitted to ICU for

continuation of care.  He was started with intravenous cefepime and also

Levaquin.

 

PAST MEDICAL HISTORY:  Significant for esophageal reflux with Pritchett

esophagus, hypertension, hyperlipidemia, chronic kidney disease, severe COPD

on oxygen, history of right bundle branch block, anxiety stress, and also

with ongoing smoking.

 

PAST SURGICAL HISTORY:  Significant for a cystoscopy in 2016, tonsillectomy

as a child and upper endoscopy for Pritchett esophagus.

 

FAMILY HISTORY:  Significant that brother has asthma.  No other significant

family history with parents.

 

SOCIAL HISTORY:  He is .  He lives with his wife.  He has 4 children. 

He is still smoking as per the wife.  He does not use any alcohol and

activities of daily living is limited due to chronic pain and chronic COPD,

and also some numbness in the extremities secondary to neuropathy.

 

REVIEW OF SYSTEMS:  Not possible from patient, but whatever required from the

wife, mentioned in history of present complaint.

 

ALLERGIES:  IODINATED CONTRAST AGENT.

 

MEDICATIONS:  He has been on amlodipine 10 mg daily; atorvastatin 40 mg

daily; carvedilol 12.5 mg b.i.d.; Celexa 20 mg daily; doxycycline 100 mg

Monday, Wednesday, Friday; folic acid 1 mg daily; lorazepam 1 mg p.o. b.i.d.;

prednisone 20 mg every 2 days and then followed by 10 mg every 2 days; home

oxygen therapy which were 3 L; and omeprazole 20 mg daily.

 

PHYSICAL EXAMINATION:

GENERAL:  On examination in the Emergency Room, he was intubated.  He was

opening eyes on commands.  He was squeezing my hands and moving the leg on

command.  No acute apparent distress.

VITAL SIGNS:  Temperature 36.7, pulse was 82, blood pressure 106/60,

saturation 100% on 100% FiO2.

HEENT:  Unremarkable.

NECK:  Supple.  No JVD, no bruit.

CHEST:  Decreased breath sounds all over with minimal bibasilar rales.

HEART:  S1, S2 regular.

ABDOMEN:  Soft, benign, slightly distended, nontender, no organomegaly. 

Bowel sounds present.

EXTREMITIES:  Trace edema bilaterally but otherwise unremarkable.

MUSCULOSKELETAL:  Did not show any acute arthritis.

CENTRAL NERVOUS SYSTEM:  Sedated on vent, opening eyes and moving limbs on

commands and also squeezing hand.

 

LABORATORY DATA:  Noted today white count was 12.80, H&H 12.8/40.4, platelet

was 237.  Arterial blood gas; pH 7.20, pCO2 of 69, pO2 420 on 100% FiO2. 

Sodium 144, potassium 4.5, chloride 109, carbon dioxide 27, BUN 23,

creatinine 1.90, no change in his creatinine compared with before.  Lactic

acid was 4.12, calcium 8.4, AST 51, ALT 37, alkaline phosphatase was 106. 

Troponin was 0.049, BNP was 4654, and albumin was 3.2.  EKG was in sinus

rhythm, rate of 90 per minute, normal axis, right bundle branch block with

associated ST segment deformity; no change compared with prior.  Chest x-ray:

 Endotracheal intubation tubing 4.5 cm about the rl, mild congestive

failure, and small parenchymal infiltrate versus atelectasis right base.

 

IMPRESSION AND PLAN:

1.  Acute respiratory failure.  The patient is intubated in field.  He is

saturating well at this time.  He has acidosis, mostly respiratory.  He will

be admitted to ICU and further pain management as per intensivist.

2.  Chronic obstructive pulmonary disease exacerbation, most likely secondary

to pneumonia and or Aspiration.  Again, blood cultures have been taken.  He

was started with intravenous cefepime and Levaquin, we will continue with

that.  He will be also given Solu-Medrol 125 mg q. 6 hourly and he will be

given nebulized bronchodilator as well.

3.  Hypertension.  Blood pressure seems in the lower side.  We will continue

with Lopressor 5 mg with hold parameters.

4.  Chronic kidney disease.  Renal function seems to be stable.  We will give

a small amount of IV fluid and monitor kidney function while in the hospital.

5.  Anxiety assess, has been on Celexa, we will hold this medicine for right

now.

6.  Hyperlipidemia.  We will hold his atorvastatin and start as soon as

possible.

7.  Gastrointestinal prophylaxis with IV Protonix.

8.  Deep venous thrombosis prophylaxis with subcutaneous heparin.

9.  Code status:  He will be a full code.

 

In my clinical judgment, the beneficiary meets criteria for 2 midnight stay

in the hospital.

 

 

 

JESICA

## 2017-08-10 NOTE — EMERGENCY ROOM VISIT NOTE
History


Report prepared by Luis Miguel:  Hussein Villalba


Under the Supervision of:  Dr. Cari Parker D.O.


First contact with patient:  18:40


Chief Complaint:  UNRESPONSIVE


Stated Complaint:  UNRESPONESIVE, SOB





History of Present Illness


The patient is a 71 year old male with a history of COPD who presents to the 

Emergency Room via EMS with shortness of breath that started prior to arrival 

today. Per EMS, the patient became very short of breath earlier today, and then 

went unresponsive.  On EMS arrival pt cynotic with agonal respirations of 4 per 

minute.  Pt bagged to assist breathing until paramedic arrived and intubated 

the pt.  Pt never lost pulse.  The patient had an EKG of sinus tachycardia at 

104 bpm, and had a blood sugar of 126. Initially hypertensive, but improved 

during transport.  Currently, the patient is at 97% oxygen saturation on 

arrival with EMS. The patient was given 4 Ativan and 50mg Fentanyl by EMS to 

help with sedation.





The patient's family witnessed the patient as he became unresponsive, and say 

that the patient was taking his rescue inhaler after feeling short of breath, 

and started lying back into his bed. The patient was noted to have an oxygen 

saturation as low as 61% before EMS was called. Wife states she has a home 

pulse oximeter and checked it before calling 911. Before the episode, the 

patient was noted to be acting and feeling fine at dinner. When EMS arrived, 

the patient was bagged, and was intubated to 99% oxygen saturation.  He has 

been intubated multiple times in the past due to his COPD. The patient wears 

2.5 liters of oxygen at home all the time per wife. She states pt had illness 

treated with antibiotics 3 weeks ago, no recent sx to suggest he was getting 

ill again, no know sick contacts.  No hx of CHF, PE, MI.  States pt takes 

prednisone daily.  History limited secondary to patient's respiratory distress.

  Hx from EMS and pt's wife on arrival.





   Source of History:  family, EMS


   History Limited By:  other (respiratory distress)


   Onset:  Prior to arrival today


   Position:  other (global - shortness of breath)


   Timing:  other (resolving)


   Modifying Factors (Relieving):  other (intubation, Ativan, Fentanyl)


Note:


Associated symptoms: Went unresponsive. Sinus tachycardia at 104 bpm, blood 

sugar of 126. Currently at 97% oxygen saturation.





Review of Systems


See HPI for pertinent positives & negatives. A total of 10 systems reviewed and 

were otherwise negative.





Past Medical & Surgical


Medical Problems:


(1) Anxiety


(2) CKD (chronic kidney disease) stage 3, GFR 30-59 ml/min


(3) Claudication


(4) COPD (chronic obstructive pulmonary disease)


(5) COPD exacerbation


(6) Dyslipidemia


(7) Elevated troponin


(8) High-grade bladder cancer


(9) History of smoking greater than 50 pack years


(10) HTN (hypertension)


(11) Hypoxia


(12) RBBB (right bundle branch block)


(13) Reflux esophagitis


(14) Shortness of breath


Surgical Problems:


(1) History of endarterectomy


(2) History of tonsillectomy and adenoidectomy








Family History


Family history omitted secondary to patient's advanced age.





Social History


Marital Status:  


Housing Status:  lives with family


Occupation Status:  retired





Current/Historical Medications


Scheduled


Amlodipine Besylate (Amlodipine Besylate), 10 MG PO DAILY


Atorvastatin (Lipitor), 40 MG PO DAILY


Budesonide/Formoterol Fumarate (Symbicort 160/4.5 Inhaler ), 2 PUFFS INH BID


Carvedilol (Coreg), 12.5 MG PO BID


Citalopram (Citalopram Hydrobromide), 20 MG PO DAILY


Doxycycline Monohydrate (Monodox), 100 MG PO MWF


Folic Acid (Folic Acid), 1 MG PO DAILY


Home O2 Therapy (Oxygen), 2.5 LITERS NA CONTINOUS


Ipratropium-Albuterol (Duoneb), 3 ML PO QID


Omeprazole (Prilosec), 20 MG PO AS DIRECTED


Prednisone (Prednisone), 20 MG PO Q2D


Prednisone Tab (Prednisone), 10 MG PO Q2D





Scheduled PRN


Ipratropium-Albuterol (Combivent Respimat), 1 PUFFS INH QID PRN for Shortness 

of Breath


Lorazepam (Lorazepam), 1 MG PO BID PRN for Anxiety





Allergies


Coded Allergies:  


     Iodinated Diagnostic Agents (Verified  Allergy, Unknown, RASH,FACIAL 

SWELLING, 8/10/17)





Physical Exam


Vital Signs











  Date Time  Temp Pulse Resp B/P (MAP) Pulse Ox O2 Delivery O2 Flow Rate FiO2


 


8/10/17 21:01    174/73    


 


8/10/17 20:51  80 21  100   


 


8/10/17 20:31    147/66    


 


8/10/17 20:16  82 18  100   


 


8/10/17 20:01    136/63    


 


8/10/17 19:51     100 Mechanical Ventilator  60


 


8/10/17 19:46  82 20  100   


 


8/10/17 19:45    128/67    


 


8/10/17 19:31    111/64    


 


8/10/17 19:22    106/60    


 


8/10/17 19:21    108/65    


 


8/10/17 19:20        60


 


8/10/17 19:16  82 25 121/66 100   


 


8/10/17 19:11  82 23  100   


 


8/10/17 19:07 36.7 90 20 125/85 100 Mechanical Ventilator  100


 


8/10/17 19:06  83 18  100   


 


8/10/17 19:04  86      


 


8/10/17 19:01  87 19  100   


 


8/10/17 18:56  92 20  100   


 


8/10/17 18:51  99 26     


 


8/10/17 18:47    126/85    











Physical Exam


GENERAL: alert, well appearing, well nourished, no distress, non-toxic 


EYE EXAM: normal conjunctiva, PERRL and EOM's grossly intact


OROPHARYNX: ET tube in oropharynx. no exudate, no erythema, lips, buccal mucosa

, and tongue normal and mucous membranes are moist


NECK: supple, no nuchal rigidity, no adenopathy, non-tender, no JVD


LUNGS: Diminished breath sounds with bibasilar rales. Normal chest wall 

mechanics


HEART: no murmurs, S1 normal and S2 normal 


ABDOMEN: abdomen soft, non-tender, normo-active bowel sounds, no masses, no 

rebound, organomegaly, pulsatile masses or guarding. 


BACK: Back is symmetrical on inspection and there is no deformity, no midline 

tenderness, no CVA tenderness. 


SKIN: no rashes and no bruising 


UPPER EXTREMITIES: Patient with multiple bruises in various stages of healing 

bilateral forearms, normal pulses, normal cap refill. No evidence of trauma, no 

deformities, no joint effusions.


LOWER EXTREMITIES: No pitting edema.


NEURO EXAM: GCS 3, intact cough and gag with suctioning of ET tube.





Medical Decision & Procedures


ER Provider


Diagnostic Interpretation:


X-ray results have been interpreted by the radiologist and reviewed by me.











CHEST ONE VIEW PORTABLE





CLINICAL HISTORY: intubated tube position





COMPARISON STUDY:  1/5/2017





FINDINGS: Endotracheal tube positioned 4.5 cm both rl. Prominent pulmonary


vasculature. No evidence for cardiac enlargement. Small parenchymal infiltrate


right base. 





IMPRESSION:  


1. Endotracheal tube 4.5 cm above the rl. 


2. Mild congestive failure.


3. Small parenchymal infiltrate versus atelectasis right base.











The above report was generated using voice recognition software.  It may contain


grammatical, syntax or spelling errors.








Electronically signed by:  Michael Martinez M.D.


8/10/2017 7:58 PM





Dictated Date/Time:  8/10/2017 7:57 PM





Laboratory Results











Test


  8/10/17


18:50 8/10/17


18:55


 


Prothrombin Time


  10.3 SECONDS


(9.0-12.0) 


 


 


Prothromb Time International


Ratio 1.0 (0.9-1.1) 


  


 


 


Pro-B-Type Natriuretic Peptide


  4654 pg/ml


(0-900) 


 


 


Globulin


  3.4 gm/dl


(2.5-4.0) 


 


 


Albumin/Globulin Ratio 0.9 (0.9-2)  


 


Bedside Lactic Acid Venous


  


  4.12 mmol/L


(0.90-1.70)














 Date/Time


Source Procedure


Growth Status


 


 


 8/10/17 00:00


Nasal MRSA DNA Surveillance Screen - Final


Specimen Negative for MRSA by DNA Probe Complete





Laboratory results per my review.





Medications Administered











 Medications


  (Trade)  Dose


 Ordered  Sig/Gasper


 Route  Start Time


 Stop Time Status Last Admin


Dose Admin


 


 Morphine Sulfate


  (MoRPHine


 SULFATE INJ)  4 mg  STK-MED ONCE


 .ROUTE  8/10/17 18:43


 8/10/17 18:44 DC 8/10/17 18:51


4 MG


 


 Propofol


  (Diprivan Iv


 Emulsion 100ml


 Vial)  1 dose  STK-MED ONCE


 IV  8/10/17 18:48


 8/10/17 18:49 DC 8/10/17 18:52


1 DOSE


 


 Albuterol/


 Ipratropium


  (Duoneb)  3 ml  NOW  STAT


 INH  8/10/17 18:48


 8/10/17 18:51 DC 8/10/17 18:48


3 ML


 


 Sodium Chloride  1,000 ml @ 


 125 mls/hr  Q8H STAT


 IV  8/10/17 18:48


 8/10/17 21:46 DC 8/10/17 18:53


125 MLS/HR


 


 Methylprednisolone


 Sodium Succinate


  (Solu-Medrol IV)  125 mg  NOW  STAT


 IV  8/10/17 18:51


 8/10/17 18:53 DC 8/10/17 18:51


125 MG


 


 Levofloxacin


  (Levaquin / D5W)  750 mg  NOW  STAT


 IV  8/10/17 19:47


 8/10/17 19:49 DC 8/10/17 19:47


750 MG


 


 Cefepime HCl 2000


 mg/Dextrose  122.6 ml @ 


 200 mls/hr  NOW  STAT


 IV  8/10/17 19:48


 8/10/17 20:24 DC 8/10/17 20:20


200 MLS/HR


 


 Furosemide


  (Lasix Inj)  40 mg  NOW  STAT


 IV  8/10/17 20:03


 8/10/17 20:06 DC 8/10/17 20:17


40 MG


 


 Aspirin


  (Aspirin Supp)  300 mg  NOW  STAT


 ME  8/10/17 20:16


 8/10/17 20:18 DC 8/10/17 20:35


300 MG











ECG


Indication:  SOB/dyspnea


Rate (beats per minute):  90


Rhythm:  normal sinus


Findings:  other (normal axis, interventricular conduction delay, nonspecific 

ST changes)


Change:  no significant change (compared to 1/7/17)





ED Course


1841: The patient was evaluated in room B1. A limited history and physical exam 

was performed. 





1848: Ordered NSS 1000 ml @ 125 mls/hr IV, Duoneb 3 ml INH.





1851: Ordered Solu-Medrol  mg IV.





1854: I reevaluated the patient and he is resting.





1900: Ordered Diprivan IV Emulsion 100ml Vial 1 dose IV PRN.





1930: I reevaluated the patient and talked to the patient's family.





1947: Ordered Levaquin / D5W 750 mg IV.





1948: Ordered Cefepime HCl 2000 mg/Dextrose 122.6 ml @ 200 mls/hr IV.





2004: I discussed the patient with Dr. Overton - [facility] intensivist.





2006: I discussed the patient with Dahiana Crooks internist - she 

will evaluate the patient for further treatment.





Medical Decision





Differential diagnosis:


Etiologies such as infections, reactive airway disease, pneumonia, pneumothorax

, COPD, CHF, cardiac ischemia, pulmonary embolism, musculoskeletal, 

gastrointestinal, as well as others were entertained.








Pt with known COPD and prior intubations on home oxygen/nebs/chronic steroids 

with rapid deterioration of breathing at home requiring call to 911 who found 

pt agonal and pt was intubated prior to arrival with improved oxygenation and 

clinical appearance according to EMS.  CXR with appearance of both infiltrate 

and early pulmonary edema.  Given pt's risk, covered for pneumonia, additional 

steroids given, lasix added due to elevated BNP - however last echo with normal 

EF.  Troponin elevated but likely due to stress of respiratory distress, doubt 

primary ACS.  No acute EKD changes. Doubt vascular etiology.  Mild leukocytosis 

could be from chronic steroids however cannot exclude early infectious response 

given appearance of cxr.  Cultures drawn as a precaution.  ASA given due to 

elevated trop as a precaution, pt takes ASA daily.  No other anticoagulation.  

Given hx and presentation today with cxr findings, doubt PE.  If additional hx 

or clinical condition changes consideration of this pt would need V/Q due to IV 

dye allergy.  Pt with mild renal impairment also.  VS stable while in the ER.  

No hx of trauma per family who witnessed event.





Head Trauma


GCS Score:  3





Medication Reconcilliation


Current Medication List:  was personally reviewed by me





Blood Pressure Screening


Patient's blood pressure:  Normal blood pressure





Consults


Time Called:  2002


Consulting Physician:  Dr. Overton - intensivist


Returned Call:  2004


I discussed the patient with Dr. Overton -  intensivist.


Additional Consults:  


   Time Called:  2004


   Consulted Physician:  Dahiana Crooks internist


   Returned Call:  2006


Additional Comments:


I discussed the patient with Dahiana Crooks internist - she will 

evaluate the patient for further treatment.





Impression





 Primary Impression:  


 Respiratory failure


 Additional Impressions:  


 COPD exacerbation


 Elevated troponin


 Elevated brain natriuretic peptide (BNP) level


 CKD (chronic kidney disease)





Critical Care


I have personally spent greater than 45 minutes of critical care time in the 

direct management of this patient.  This includes bedside care, interpretation 

of diagnostic studies, and testing, discussion with consultants, patient, and 

family members, and other required patient management activities.  This 45 

minutes is in excess of all separately billable procedures.





Involved system: respiratory, cardiac





Scribe Attestation


The scribe's documentation has been prepared under my direction and personally 

reviewed by me in its entirety. I confirm that the note above accurately 

reflects all work, treatment, procedures, and medical decision making performed 

by me.





Departure Information


Dispostion


Being Evaluated By Hospitalist





Patient Instructions


[facility]





Problem Qualifiers








 Primary Impression:  


 Respiratory failure


 Chronicity:  acute  Respiratory failure complication:  hypoxia and hypercapnia

  Qualified Codes:  J96.01 - Acute respiratory failure with hypoxia; J96.02 - 

Acute respiratory failure with hypercapnia


 Additional Impressions:  


 CKD (chronic kidney disease)


 Chronic kidney disease stage:  unspecified stage  Qualified Codes:  N18.9 - 

Chronic kidney disease, unspecified

## 2017-08-10 NOTE — DIAGNOSTIC IMAGING REPORT
CHEST ONE VIEW PORTABLE



CLINICAL HISTORY: intubated tube position



COMPARISON STUDY:  1/5/2017



FINDINGS: Endotracheal tube positioned 4.5 cm both rl. Prominent pulmonary

vasculature. No evidence for cardiac enlargement. Small parenchymal infiltrate

right base. 



IMPRESSION:  

1. Endotracheal tube 4.5 cm above the rl. 

2. Mild congestive failure.

3. Small parenchymal infiltrate versus atelectasis right base.











The above report was generated using voice recognition software.  It may contain

grammatical, syntax or spelling errors.









Electronically signed by:  Michael Martinez M.D.

8/10/2017 7:58 PM



Dictated Date/Time:  8/10/2017 7:57 PM

## 2017-08-11 VITALS — HEART RATE: 85 BPM | OXYGEN SATURATION: 99 %

## 2017-08-11 VITALS — DIASTOLIC BLOOD PRESSURE: 98 MMHG | SYSTOLIC BLOOD PRESSURE: 153 MMHG | OXYGEN SATURATION: 100 % | HEART RATE: 94 BPM

## 2017-08-11 VITALS — SYSTOLIC BLOOD PRESSURE: 110 MMHG | OXYGEN SATURATION: 97 % | HEART RATE: 60 BPM | DIASTOLIC BLOOD PRESSURE: 56 MMHG

## 2017-08-11 VITALS — DIASTOLIC BLOOD PRESSURE: 87 MMHG | OXYGEN SATURATION: 95 % | HEART RATE: 80 BPM | SYSTOLIC BLOOD PRESSURE: 169 MMHG

## 2017-08-11 VITALS — DIASTOLIC BLOOD PRESSURE: 83 MMHG | SYSTOLIC BLOOD PRESSURE: 177 MMHG | OXYGEN SATURATION: 100 % | HEART RATE: 76 BPM

## 2017-08-11 VITALS — HEART RATE: 78 BPM | OXYGEN SATURATION: 100 %

## 2017-08-11 VITALS — OXYGEN SATURATION: 99 % | HEART RATE: 77 BPM

## 2017-08-11 VITALS — SYSTOLIC BLOOD PRESSURE: 168 MMHG | DIASTOLIC BLOOD PRESSURE: 85 MMHG | OXYGEN SATURATION: 98 % | HEART RATE: 76 BPM

## 2017-08-11 VITALS — HEART RATE: 76 BPM | DIASTOLIC BLOOD PRESSURE: 81 MMHG | SYSTOLIC BLOOD PRESSURE: 181 MMHG | OXYGEN SATURATION: 98 %

## 2017-08-11 VITALS — OXYGEN SATURATION: 96 % | HEART RATE: 94 BPM

## 2017-08-11 VITALS — DIASTOLIC BLOOD PRESSURE: 85 MMHG | OXYGEN SATURATION: 98 % | SYSTOLIC BLOOD PRESSURE: 157 MMHG | HEART RATE: 79 BPM

## 2017-08-11 VITALS — HEART RATE: 79 BPM | OXYGEN SATURATION: 95 %

## 2017-08-11 VITALS — SYSTOLIC BLOOD PRESSURE: 121 MMHG | DIASTOLIC BLOOD PRESSURE: 59 MMHG | HEART RATE: 59 BPM | OXYGEN SATURATION: 97 %

## 2017-08-11 VITALS
HEART RATE: 68 BPM | OXYGEN SATURATION: 97 % | DIASTOLIC BLOOD PRESSURE: 74 MMHG | SYSTOLIC BLOOD PRESSURE: 168 MMHG | TEMPERATURE: 97.7 F

## 2017-08-11 VITALS — OXYGEN SATURATION: 97 % | HEART RATE: 59 BPM

## 2017-08-11 VITALS
DIASTOLIC BLOOD PRESSURE: 53 MMHG | TEMPERATURE: 97.52 F | OXYGEN SATURATION: 98 % | SYSTOLIC BLOOD PRESSURE: 116 MMHG | HEART RATE: 64 BPM

## 2017-08-11 VITALS — HEART RATE: 83 BPM | SYSTOLIC BLOOD PRESSURE: 167 MMHG | DIASTOLIC BLOOD PRESSURE: 77 MMHG | OXYGEN SATURATION: 100 %

## 2017-08-11 VITALS
DIASTOLIC BLOOD PRESSURE: 65 MMHG | HEART RATE: 61 BPM | TEMPERATURE: 97.52 F | OXYGEN SATURATION: 96 % | SYSTOLIC BLOOD PRESSURE: 124 MMHG

## 2017-08-11 VITALS
SYSTOLIC BLOOD PRESSURE: 191 MMHG | HEART RATE: 104 BPM | OXYGEN SATURATION: 95 % | TEMPERATURE: 97.7 F | DIASTOLIC BLOOD PRESSURE: 96 MMHG

## 2017-08-11 VITALS — HEART RATE: 87 BPM | OXYGEN SATURATION: 99 %

## 2017-08-11 VITALS — HEART RATE: 86 BPM | OXYGEN SATURATION: 99 % | DIASTOLIC BLOOD PRESSURE: 89 MMHG | SYSTOLIC BLOOD PRESSURE: 131 MMHG

## 2017-08-11 VITALS — DIASTOLIC BLOOD PRESSURE: 152 MMHG | SYSTOLIC BLOOD PRESSURE: 183 MMHG | HEART RATE: 97 BPM | OXYGEN SATURATION: 98 %

## 2017-08-11 VITALS — HEART RATE: 87 BPM | OXYGEN SATURATION: 98 %

## 2017-08-11 VITALS — HEART RATE: 88 BPM | SYSTOLIC BLOOD PRESSURE: 181 MMHG | DIASTOLIC BLOOD PRESSURE: 77 MMHG | OXYGEN SATURATION: 99 %

## 2017-08-11 VITALS
OXYGEN SATURATION: 99 % | TEMPERATURE: 97.7 F | DIASTOLIC BLOOD PRESSURE: 87 MMHG | HEART RATE: 88 BPM | SYSTOLIC BLOOD PRESSURE: 179 MMHG

## 2017-08-11 VITALS
HEART RATE: 62 BPM | SYSTOLIC BLOOD PRESSURE: 105 MMHG | DIASTOLIC BLOOD PRESSURE: 56 MMHG | OXYGEN SATURATION: 98 % | TEMPERATURE: 97.7 F

## 2017-08-11 VITALS — SYSTOLIC BLOOD PRESSURE: 176 MMHG | OXYGEN SATURATION: 94 % | DIASTOLIC BLOOD PRESSURE: 86 MMHG | HEART RATE: 81 BPM

## 2017-08-11 VITALS — DIASTOLIC BLOOD PRESSURE: 57 MMHG | SYSTOLIC BLOOD PRESSURE: 124 MMHG | HEART RATE: 61 BPM | OXYGEN SATURATION: 99 %

## 2017-08-11 VITALS — OXYGEN SATURATION: 97 % | HEART RATE: 58 BPM

## 2017-08-11 VITALS — HEART RATE: 83 BPM | OXYGEN SATURATION: 97 % | DIASTOLIC BLOOD PRESSURE: 67 MMHG | SYSTOLIC BLOOD PRESSURE: 153 MMHG

## 2017-08-11 VITALS — HEART RATE: 64 BPM | DIASTOLIC BLOOD PRESSURE: 65 MMHG | SYSTOLIC BLOOD PRESSURE: 124 MMHG | OXYGEN SATURATION: 95 %

## 2017-08-11 VITALS — SYSTOLIC BLOOD PRESSURE: 103 MMHG | DIASTOLIC BLOOD PRESSURE: 53 MMHG | HEART RATE: 59 BPM | OXYGEN SATURATION: 97 %

## 2017-08-11 VITALS — HEART RATE: 79 BPM | OXYGEN SATURATION: 97 %

## 2017-08-11 VITALS — HEART RATE: 103 BPM | DIASTOLIC BLOOD PRESSURE: 83 MMHG | OXYGEN SATURATION: 97 % | SYSTOLIC BLOOD PRESSURE: 177 MMHG

## 2017-08-11 VITALS — HEART RATE: 85 BPM | OXYGEN SATURATION: 93 %

## 2017-08-11 VITALS — HEART RATE: 60 BPM | SYSTOLIC BLOOD PRESSURE: 131 MMHG | OXYGEN SATURATION: 97 % | DIASTOLIC BLOOD PRESSURE: 63 MMHG

## 2017-08-11 VITALS — SYSTOLIC BLOOD PRESSURE: 182 MMHG | HEART RATE: 84 BPM | DIASTOLIC BLOOD PRESSURE: 88 MMHG | OXYGEN SATURATION: 99 %

## 2017-08-11 VITALS — DIASTOLIC BLOOD PRESSURE: 68 MMHG | HEART RATE: 70 BPM | SYSTOLIC BLOOD PRESSURE: 128 MMHG | OXYGEN SATURATION: 99 %

## 2017-08-11 VITALS — HEART RATE: 101 BPM | DIASTOLIC BLOOD PRESSURE: 85 MMHG | OXYGEN SATURATION: 95 % | SYSTOLIC BLOOD PRESSURE: 189 MMHG

## 2017-08-11 VITALS — OXYGEN SATURATION: 99 % | HEART RATE: 80 BPM

## 2017-08-11 VITALS — DIASTOLIC BLOOD PRESSURE: 80 MMHG | HEART RATE: 94 BPM | SYSTOLIC BLOOD PRESSURE: 179 MMHG | OXYGEN SATURATION: 97 %

## 2017-08-11 VITALS — OXYGEN SATURATION: 95 % | HEART RATE: 65 BPM

## 2017-08-11 VITALS — OXYGEN SATURATION: 93 % | SYSTOLIC BLOOD PRESSURE: 168 MMHG | HEART RATE: 82 BPM | DIASTOLIC BLOOD PRESSURE: 74 MMHG

## 2017-08-11 VITALS — DIASTOLIC BLOOD PRESSURE: 65 MMHG | OXYGEN SATURATION: 96 % | HEART RATE: 61 BPM | SYSTOLIC BLOOD PRESSURE: 124 MMHG

## 2017-08-11 VITALS — OXYGEN SATURATION: 96 %

## 2017-08-11 VITALS — OXYGEN SATURATION: 97 % | HEART RATE: 71 BPM

## 2017-08-11 VITALS — HEART RATE: 79 BPM | OXYGEN SATURATION: 93 %

## 2017-08-11 VITALS
HEART RATE: 59 BPM | SYSTOLIC BLOOD PRESSURE: 131 MMHG | TEMPERATURE: 97.52 F | DIASTOLIC BLOOD PRESSURE: 63 MMHG | OXYGEN SATURATION: 98 %

## 2017-08-11 VITALS — OXYGEN SATURATION: 96 % | HEART RATE: 60 BPM

## 2017-08-11 VITALS — OXYGEN SATURATION: 96 % | HEART RATE: 80 BPM

## 2017-08-11 VITALS — OXYGEN SATURATION: 98 % | HEART RATE: 80 BPM

## 2017-08-11 VITALS — OXYGEN SATURATION: 99 % | HEART RATE: 88 BPM

## 2017-08-11 VITALS — HEART RATE: 87 BPM | OXYGEN SATURATION: 100 %

## 2017-08-11 VITALS — HEART RATE: 82 BPM | OXYGEN SATURATION: 97 %

## 2017-08-11 LAB
ANION GAP SERPL CALC-SCNC: 6 MMOL/L (ref 3–11)
BASE EXCESS STD BLDA CALC-SCNC: -2 MEQ/L (ref -9–1.8)
BASE EXCESS STD BLDA CALC-SCNC: 1 MEQ/L (ref -9–1.8)
BASOPHILS # BLD: 0.01 K/UL (ref 0–0.2)
BASOPHILS NFR BLD: 0.1 %
BUN SERPL-MCNC: 31 MG/DL (ref 7–18)
BUN/CREAT SERPL: 14.9 (ref 10–20)
CALCIUM SERPL-MCNC: 7.9 MG/DL (ref 8.5–10.1)
CHLORIDE SERPL-SCNC: 109 MMOL/L (ref 98–107)
CKMB/CK RATIO: 4.8 (ref 0–3)
CO2 SERPL-SCNC: 26 MMOL/L (ref 21–32)
COMPLETE: YES
CREAT CL PREDICTED SERPL C-G-VRATE: 30.7 ML/MIN
CREAT SERPL-MCNC: 2.1 MG/DL (ref 0.6–1.4)
EOSINOPHIL NFR BLD AUTO: 197 K/UL (ref 130–400)
GLUCOSE SERPL-MCNC: 140 MG/DL (ref 70–99)
HCT VFR BLD CALC: 37 % (ref 42–52)
IG%: 0.5 %
IMM GRANULOCYTES NFR BLD AUTO: 10 %
INHALED O2 CONCENTRATION: 30 %
INHALED O2 CONCENTRATION: 40 %
ISTAT ARTERIAL BLD GAS O2 SAT: 97 % (ref 90–95)
ISTAT ARTERIAL BLD GAS O2 SAT: 98 % (ref 90–95)
ISTAT ARTERIAL BLOOD GAS HCO3: 24 MEQ/L (ref 19–24)
ISTAT ARTERIAL BLOOD GAS HCO3: 26 MEQ/L (ref 19–24)
ISTAT ARTERIAL BLOOD GAS PCO2: 43 MMHG (ref 35–46)
ISTAT ARTERIAL BLOOD GAS PCO2: 44 MMHG (ref 35–46)
ISTAT ARTERIAL BLOOD GAS PH: 7.35 (ref 7.35–7.45)
ISTAT ARTERIAL BLOOD GAS PH: 7.38 (ref 7.35–7.45)
ISTAT ARTERIAL BLOOD GAS PO2: 105 MMHG (ref 80–95)
ISTAT ARTERIAL BLOOD GAS PO2: 96 MMHG (ref 80–95)
ISTAT CARBON DIOXIDE: 25 MEQ/L (ref 24–31)
ISTAT CARBON DIOXIDE: 28 MEQ/L (ref 24–31)
ISTAT PEEP: 5
ISTAT PEEP: 5
ISTAT RATE: 18
LYMPHOCYTES # BLD: 0.97 K/UL (ref 1.2–3.4)
MCH RBC QN AUTO: 28.5 PG (ref 25–34)
MCHC RBC AUTO-ENTMCNC: 31.9 G/DL (ref 32–36)
MCV RBC AUTO: 89.4 FL (ref 80–100)
MONOCYTES NFR BLD: 2.7 %
NEUTROPHILS # BLD AUTO: 0.1 %
NEUTROPHILS NFR BLD AUTO: 86.6 %
PMV BLD AUTO: 9.5 FL (ref 7.4–10.4)
POTASSIUM SERPL-SCNC: 4.2 MMOL/L (ref 3.5–5.1)
RBC # BLD AUTO: 4.14 M/UL (ref 4.7–6.1)
SODIUM SERPL-SCNC: 141 MMOL/L (ref 136–145)
VE: 11.1
WBC # BLD AUTO: 9.71 K/UL (ref 4.8–10.8)

## 2017-08-11 RX ADMIN — METHYLPREDNISOLONE SODIUM SUCCINATE SCH MLS/MIN: 1 INJECTION, POWDER, FOR SOLUTION INTRAMUSCULAR; INTRAVENOUS at 18:12

## 2017-08-11 RX ADMIN — ALBUTEROL SULFATE SCH PUFFS: 90 AEROSOL, METERED RESPIRATORY (INHALATION) at 07:09

## 2017-08-11 RX ADMIN — SODIUM CHLORIDE AND POTASSIUM CHLORIDE SCH MLS/HR: 9; 1.49 INJECTION, SOLUTION INTRAVENOUS at 11:11

## 2017-08-11 RX ADMIN — PROPOFOL PRN DOSE: 10 INJECTION, EMULSION INTRAVENOUS at 03:37

## 2017-08-11 RX ADMIN — CEFEPIME HYDROCHLORIDE SCH MLS/HR: 1 INJECTION, POWDER, FOR SOLUTION INTRAMUSCULAR; INTRAVENOUS at 18:12

## 2017-08-11 RX ADMIN — HEPARIN SODIUM SCH UNIT: 10000 INJECTION, SOLUTION INTRAVENOUS; SUBCUTANEOUS at 14:02

## 2017-08-11 RX ADMIN — HEPARIN SODIUM SCH UNIT: 10000 INJECTION, SOLUTION INTRAVENOUS; SUBCUTANEOUS at 06:59

## 2017-08-11 RX ADMIN — BUDESONIDE AND FORMOTEROL FUMARATE DIHYDRATE SCH PUFFS: 160; 4.5 AEROSOL RESPIRATORY (INHALATION) at 21:13

## 2017-08-11 RX ADMIN — METOPROLOL TARTRATE SCH MG: 5 INJECTION, SOLUTION INTRAVENOUS at 06:20

## 2017-08-11 RX ADMIN — METHYLPREDNISOLONE SODIUM SUCCINATE SCH MLS/MIN: 1 INJECTION, POWDER, FOR SOLUTION INTRAMUSCULAR; INTRAVENOUS at 11:21

## 2017-08-11 RX ADMIN — IPRATROPIUM BROMIDE SCH PUFFS: 17 AEROSOL, METERED RESPIRATORY (INHALATION) at 07:09

## 2017-08-11 RX ADMIN — IPRATROPIUM BROMIDE SCH PUFFS: 17 AEROSOL, METERED RESPIRATORY (INHALATION) at 11:10

## 2017-08-11 RX ADMIN — IPRATROPIUM BROMIDE AND ALBUTEROL SULFATE SCH ML: .5; 3 SOLUTION RESPIRATORY (INHALATION) at 16:15

## 2017-08-11 RX ADMIN — PROPOFOL PRN DOSE: 10 INJECTION, EMULSION INTRAVENOUS at 08:02

## 2017-08-11 RX ADMIN — SODIUM CHLORIDE SCH MLS/HR: 900 INJECTION, SOLUTION INTRAVENOUS at 18:11

## 2017-08-11 RX ADMIN — HEPARIN SODIUM SCH UNIT: 10000 INJECTION, SOLUTION INTRAVENOUS; SUBCUTANEOUS at 21:14

## 2017-08-11 RX ADMIN — AMLODIPINE BESYLATE SCH MG: 5 TABLET ORAL at 16:33

## 2017-08-11 RX ADMIN — METHYLPREDNISOLONE SODIUM SUCCINATE SCH MLS/MIN: 1 INJECTION, POWDER, FOR SOLUTION INTRAMUSCULAR; INTRAVENOUS at 00:10

## 2017-08-11 RX ADMIN — ALBUTEROL SULFATE SCH PUFFS: 90 AEROSOL, METERED RESPIRATORY (INHALATION) at 11:10

## 2017-08-11 RX ADMIN — IPRATROPIUM BROMIDE AND ALBUTEROL SULFATE SCH ML: .5; 3 SOLUTION RESPIRATORY (INHALATION) at 19:40

## 2017-08-11 RX ADMIN — METOPROLOL TARTRATE SCH MG: 5 INJECTION, SOLUTION INTRAVENOUS at 00:00

## 2017-08-11 RX ADMIN — METHYLPREDNISOLONE SODIUM SUCCINATE SCH MLS/MIN: 1 INJECTION, POWDER, FOR SOLUTION INTRAMUSCULAR; INTRAVENOUS at 06:20

## 2017-08-11 NOTE — CRITICAL CARE CONSULTATION
Critical Care Consultation


Date of Consultation:


Aug 11, 2017.


Attending Physician:


Matt Funez MD


Reason for Consultation:


Hypercarbic respiratory acidosis with respiratory failure in the setting of 

COPD exacerbation


History of Present Illness


Patient is a 71-year-old male who was admitted to the intensive care unit 

overnight secondary to hypercarbic respiratory failure in the setting of COPD 

exacerbation.  Patient was intubated in the field after episodes of 

unresponsiveness.  Per chart review, the patient was having worsening shortness 

of breath and cough throughout the day.  He went to "unresponsive" per patient'

s wife and per EMS.  Patient has reported long-standing history of COPD with 

multiple exacerbations requiring intubation the past.  He did receive IV 

steroids, IV antibiotics, and breathing treatments while in the emergency 

setting.  The patient remains intubated this morning on assist control with 

respiratory rate of 18/m, tidal volume 500, PEEP of 5, and FiO2 of 30%.  

Apparently, the patient was weaned off his propofol early this morning, however 

he became extremely combative.  Because of this, his propofol was reinstituted.

  Patient is sedated and does not injury to history of present illness this 

point.





Past Medical/Surgical History


Per previous documents:


Respiratory failure


Anxiety


Chronic kidney disease stage III


Claudication


COPD exacerbation


Dyslipidemia


Elevated troponin


High-grade bladder cancer


History of smoking greater than 50 pack years


Hypertension


Hypoxia


Reflux esophagitis with Pritchett's esophagus





Family History





No pertinent family history





Social History


Smoking Status:  Former Smoker


Drug Use:  none


Marital Status:  


Housing Status:  lives with family


Occupation Status:  retired





Allergies


Coded Allergies:  


     Iodinated Diagnostic Agents (Verified  Allergy, Unknown, RASH,FACIAL 

SWELLING, 8/10/17)





Home Medications


Scheduled


Amlodipine Besylate (Amlodipine Besylate), 10 MG PO DAILY


Atorvastatin (Lipitor), 40 MG PO DAILY


Budesonide/Formoterol Fumarate (Symbicort 160/4.5 Inhaler ), 2 PUFFS INH BID


Carvedilol (Coreg), 12.5 MG PO BID


Citalopram (Citalopram Hydrobromide), 20 MG PO DAILY


Doxycycline Monohydrate (Monodox), 100 MG PO MWF


Folic Acid (Folic Acid), 1 MG PO DAILY


Home O2 Therapy (Oxygen), 2.5 LITERS NA CONTINOUS


Ipratropium-Albuterol (Duoneb), 3 ML PO QID


Omeprazole (Prilosec), 20 MG PO AS DIRECTED


Prednisone (Prednisone), 20 MG PO Q2D


Prednisone Tab (Prednisone), 10 MG PO Q2D





Scheduled PRN


Ipratropium-Albuterol (Combivent Respimat), 1 PUFFS INH QID PRN for Shortness 

of Breath


Lorazepam (Lorazepam), 1 MG PO BID PRN for Anxiety





Current Inpatient Medications





Current Inpatient Medications








 Medications


  (Trade)  Dose


 Ordered  Sig/Gasper


 Route  Start Time


 Stop Time Status Last Admin


Dose Admin


 


 Heparin Sodium


  (Porcine)


  (Heparin Sq 5000


 Unit/0.5ml)  5,000 unit  Q8


 SQ  8/11/17 06:00


 9/10/17 05:59  8/11/17 06:59


5,000 UNIT


 


 Potassium


 Chloride/Sodium


 Chloride  1,000 ml @ 


 75 mls/hr  [id number]


 IV  8/10/17 22:00


 9/9/17 21:59  8/11/17 11:11


75 MLS/HR


 


 Pantoprazole


 Sodium 40 mg/


 Syringe  10 ml @ 5


 mls/min  DAILY@1100


 IV  8/11/17 11:00


 9/10/17 10:59  8/11/17 11:11


5 MLS/MIN


 


 Budesonide/


 Formoterol


 Fumarate


  (Symbicort 160/


 4.5 Inh)  2 puffs  BID


 INH  8/11/17 09:00


 9/10/17 08:59 Future Hold  


 


 


 Albuterol/


 Ipratropium


  (Combivent


 Respimat Inh)  1 puffs  QID  PRN


 INH  8/10/17 21:15


 9/9/17 21:14 Future Hold  


 


 


 Methylprednisolone


 Sodium Succinate


 125 mg/Syringe  2 ml @ 1.5


 mls/min  Q6H


 IV  8/11/17 00:00


 9/10/17 00:00  8/11/17 11:21


1.5 MLS/MIN


 


 Cefepime HCl 1000


 mg/Dextrose  111.3 ml @ 


 200 mls/hr  Q24H


 IV  8/11/17 19:00


 8/17/17 18:59   


 


 


 Levofloxacin 750


 mg/Prmx  150 ml @ 


 100 mls/hr  Q48H


 IV  8/12/17 20:00


 8/17/17 19:59   


 


 


 Ipratropium


 Bromide


  (Atrovent Hfa


 Inhaler)  4 puffs  QIDR


 INH  8/11/17 08:00


 9/10/17 07:59  8/11/17 11:10


4 PUFFS


 


 Albuterol


  (Ventolin Hfa


 Inhaler)  4 puffs  QIDR


 INH  8/11/17 08:00


 9/10/17 07:59  8/11/17 11:10


4 PUFFS


 


 Propofol


  (Diprivan Iv


 Emulsion 100ml


 Vial)  1 dose  PRN  PRN


 IV  8/11/17 03:30


 8/14/17 03:29  8/11/17 08:02


1 DOSE


 


 Metoprolol


 Tartrate


  (Lopressor Iv)  5 mg  Q6H  PRN


 IV  8/11/17 08:45


 9/10/17 08:44   


 











Review of Systems


Unable to obtain secondary to patient's current state of intubation/sedation.





Physical Exam











  Date Time  Temp Pulse Resp B/P (MAP) Pulse Ox O2 Delivery O2 Flow Rate FiO2


 


8/11/17 11:40  82   97   21


 


8/11/17 11:10        30


 


8/11/17 11:10        30


 


8/11/17 11:10      Mechanical Ventilator  30


 


8/11/17 11:10 36.5 68 18 168/74 (105) 97 Mechanical Ventilator  30


 


8/11/17 09:30 36.4 59 18 131/63 (85) 97 Mechanical Ventilator  30


 


8/11/17 07:30 36.4 62 18 124/65 (84) 96 Mechanical Ventilator  30


 


8/11/17 07:30        30


 


8/11/17 07:30     97 Mechanical Ventilator  30


 


8/11/17 07:20        30


 


8/11/17 06:20  81  146/78    


 


8/11/17 06:00  83 22 153/67 (95) 97 Mechanical Ventilator  30


 


8/11/17 05:50        30


 


8/11/17 04:00        40


 


8/11/17 04:00     100 Mechanical Ventilator  40


 


8/11/17 04:00 36.4 64 18 116/53 (74) 98 Mechanical Ventilator  40


 


8/11/17 04:00     100 Mechanical Ventilator  40


 


8/11/17 02:00  61 18 124/57 (79) 99 Mechanical Ventilator  40


 


8/11/17 02:00        40


 


8/11/17 00:01 36.5 62 18 105/56 (72) 98 Mechanical Ventilator  40


 


8/10/17 23:59        40


 


8/10/17 23:59     100 Mechanical Ventilator  40


 


8/10/17 23:13        40


 


8/10/17 22:10 36.9 80 20 197/93 100 Mechanical Ventilator  


 


8/10/17 21:26 36.7 79 16 168/84 100   


 


8/10/17 21:21  79 16  100   


 


8/10/17 21:18    168/84    


 


8/10/17 21:08    172/79    


 


8/10/17 21:01    174/73    


 


8/10/17 20:51  80 21  100   


 


8/10/17 20:31    147/66    


 


8/10/17 20:16  82 18  100   


 


8/10/17 20:01    136/63    


 


8/10/17 19:51     100 Mechanical Ventilator  60


 


8/10/17 19:46  82 20  100   


 


8/10/17 19:45    128/67    


 


8/10/17 19:31    111/64    


 


8/10/17 19:22    106/60    


 


8/10/17 19:21    108/65    


 


8/10/17 19:20        60


 


8/10/17 19:16  82 25 121/66 100   


 


8/10/17 19:11  82 23  100   


 


8/10/17 19:07 36.7 90 20 125/85 100 Mechanical Ventilator  100


 


8/10/17 19:06  83 18  100   


 


8/10/17 19:04  86      


 


8/10/17 19:01  87 19  100   


 


8/10/17 18:56  92 20  100   


 


8/10/17 18:51  99 26     


 


8/10/17 18:47    126/85    








VITAL SIGNS - Vital signs and nursing notes were reviewed.


GENERAL - 71-year-old male appearing his stated age who is sedated and on the 

ventilator.


SKIN - Without central cyanosis.


HEAD - NC/AT.


EYES - Sluggish, equal pupils bilaterally. Sclera anicteric. Palpebral 

conjunctiva pink and moist with no injection noted.


NECK - Neck with FROM. Supple to palpation.


LUNGS - Chest wall symmetric without accessory muscle use, intercostals 

retractions, or central cyanosis. Normal vesicular breath sounds CTA B/L. No 

wheezes, rales, or rhonchi appreciated.


CARDIAC - RRR with S1/S2. No murmur, rubs, or gallops appreciated. 


ABDOMEN - Abdominal contour flat without pulsations or visible masses. BS 

normoactive all four quadrants. No tenderness, palpable masses, 

hepatosplenomegaly, or ascites noted.


EXTREMITIES - No clubbing or peripheral cyanosis. No pretibial edema present. 


NEUROLOGIC - Sedated and endotracheally intubated.


PSYCH - Unable to assess 2/2 current state.





Laboratory Results





Last 24 Hours








Test


  8/10/17


18:50 8/10/17


18:55 8/10/17


19:04 8/11/17


00:07


 


White Blood Count 12.80 K/uL    


 


Red Blood Count 4.44 M/uL    


 


Hemoglobin 12.8 g/dL    


 


Hematocrit 40.4 %    


 


Mean Corpuscular Volume 91.0 fL    


 


Mean Corpuscular Hemoglobin 28.8 pg    


 


Mean Corpuscular Hemoglobin


Concent 31.7 g/dl 


  


  


  


 


 


Platelet Count 237 K/uL    


 


Mean Platelet Volume 9.8 fL    


 


Neutrophils (%) (Auto) 63.8 %    


 


Lymphocytes (%) (Auto) 25.5 %    


 


Monocytes (%) (Auto) 7.9 %    


 


Eosinophils (%) (Auto) 1.5 %    


 


Basophils (%) (Auto) 0.2 %    


 


Neutrophils # (Auto) 8.17 K/uL    


 


Lymphocytes # (Auto) 3.27 K/uL    


 


Monocytes # (Auto) 1.01 K/uL    


 


Eosinophils # (Auto) 0.19 K/uL    


 


Basophils # (Auto) 0.02 K/uL    


 


RDW Standard Deviation 47.3 fL    


 


RDW Coefficient of Variation 14.2 %    


 


Immature Granulocyte % (Auto) 1.1 %    


 


Immature Granulocyte # (Auto) 0.14 K/uL    


 


Prothrombin Time 10.3 SECONDS    


 


Prothromb Time International


Ratio 1.0 


  


  


  


 


 


Sodium Level 144 mmol/L    


 


Potassium Level 4.5 mmol/L    


 


Chloride Level 109 mmol/L    


 


Carbon Dioxide Level 27 mmol/L    


 


Anion Gap 8.0 mmol/L    


 


Blood Urea Nitrogen 23 mg/dl    


 


Creatinine 1.90 mg/dl    


 


Est Creatinine Clear Calc


Drug Dose 34.7 ml/min 


  


  


  


 


 


Estimated GFR (


American) 40.2 


  


  


  


 


 


Estimated GFR (Non-


American 34.7 


  


  


  


 


 


BUN/Creatinine Ratio 12.1    


 


Random Glucose 78 mg/dl    


 


Calcium Level 8.4 mg/dl    


 


Total Bilirubin 0.3 mg/dl    


 


Aspartate Amino Transf


(AST/SGOT) 51 U/L 


  


  


  


 


 


Alanine Aminotransferase


(ALT/SGPT) 37 U/L 


  


  


  


 


 


Alkaline Phosphatase 106 U/L    


 


Troponin I 0.049 ng/ml    


 


Pro-B-Type Natriuretic Peptide 4654 pg/ml    


 


Total Protein 6.6 gm/dl    


 


Albumin 3.2 gm/dl    


 


Globulin 3.4 gm/dl    


 


Albumin/Globulin Ratio 0.9    


 


Bedside Lactic Acid Venous  4.12 mmol/L   


 


Bedside Blood Gas pH (LAB)   7.20  


 


Bedside Blood Gas pCO2 (LAB)   69 mmHg  


 


Bedside Blood Gas pO2 (LAB)   > 420 mmHg  


 


Bedside Blood Gas HCO3 (LAB)   27 meq/L  


 


Bedside Blood Gas Total CO2   29 mEq/l  


 


Bedside Blood Gas Base Excess


(LAB) 


  


  -1.0 meq/L 


  


 


 


Bedside Blood Gas O2


Saturation 


  


  100.0 % 


  


 


 


Bedside Glucose    170 mg/dl 


 


Test


  8/11/17


05:18 8/11/17


05:53 8/11/17


06:21 8/11/17


10:44


 


White Blood Count 9.71 K/uL    


 


Red Blood Count 4.14 M/uL    


 


Hemoglobin 11.8 g/dL    


 


Hematocrit 37.0 %    


 


Mean Corpuscular Volume 89.4 fL    


 


Mean Corpuscular Hemoglobin 28.5 pg    


 


Mean Corpuscular Hemoglobin


Concent 31.9 g/dl 


  


  


  


 


 


Platelet Count 197 K/uL    


 


Mean Platelet Volume 9.5 fL    


 


Neutrophils (%) (Auto) 86.6 %    


 


Lymphocytes (%) (Auto) 10.0 %    


 


Monocytes (%) (Auto) 2.7 %    


 


Eosinophils (%) (Auto) 0.1 %    


 


Basophils (%) (Auto) 0.1 %    


 


Neutrophils # (Auto) 8.41 K/uL    


 


Lymphocytes # (Auto) 0.97 K/uL    


 


Monocytes # (Auto) 0.26 K/uL    


 


Eosinophils # (Auto) 0.01 K/uL    


 


Basophils # (Auto) 0.01 K/uL    


 


RDW Standard Deviation 46.8 fL    


 


RDW Coefficient of Variation 14.2 %    


 


Immature Granulocyte % (Auto) 0.5 %    


 


Immature Granulocyte # (Auto) 0.05 K/uL    


 


Total Creatine Kinase 85 U/L    


 


Creatine Kinase MB 4.1 ng/ml    


 


Creatine Kinase MB Ratio 4.8     


 


Troponin I 0.215 ng/ml    


 


Blood Gas Sample Site  R Radial   


 


Bedside Blood Gas pH (LAB)  7.38   


 


Bedside Blood Gas pCO2 (LAB)  44 mmHg   


 


Bedside Blood Gas pO2 (LAB)  105 mmHg   


 


Bedside Blood Gas HCO3 (LAB)  26 meq/L   


 


Bedside Blood Gas Total CO2  28 mEq/l   


 


Bedside Blood Gas Base Excess


(LAB) 


  1.0 meq/L 


  


  


 


 


Bedside Blood Gas O2


Saturation 


  98.0 % 


  


  


 


 


Gigi Test  Pass   


 


Oxygen Delivery Device  Ventilator   


 


Bedside Oxygen Rate


(breaths/min) 


  18 


  


  


 


 


Blood Gas Minute Ventilation  11.1   


 


Bedside FiO2  40 %   


 


Blood Gas Tidal Volume  500   


 


Blood Gas PEEP  5   


 


Bedside Glucose   150 mg/dl  


 


Test


  8/11/17


11:21 8/11/17


11:24 


  


 


 


Sodium Level 141 mmol/L    


 


Potassium Level 4.2 mmol/L    


 


Chloride Level 109 mmol/L    


 


Carbon Dioxide Level 26 mmol/L    


 


Anion Gap 6.0 mmol/L    


 


Blood Urea Nitrogen 31 mg/dl    


 


Creatinine 2.10 mg/dl    


 


Est Creatinine Clear Calc


Drug Dose 30.7 ml/min 


  


  


  


 


 


Estimated GFR (


American) 35.6 


  


  


  


 


 


Estimated GFR (Non-


American 30.7 


  


  


  


 


 


BUN/Creatinine Ratio 14.9    


 


Random Glucose 140 mg/dl    


 


Lactic Acid Level 2.0 mmol/L    


 


Calcium Level 7.9 mg/dl    


 


Creatine Kinase MB 3.9 ng/ml    


 


Troponin I 0.172 ng/ml    


 


Blood Gas Sample Site  R Radial   


 


Bedside Blood Gas pH (LAB)  7.35   


 


Bedside Blood Gas pCO2 (LAB)  43 mmHg   


 


Bedside Blood Gas pO2 (LAB)  96 mmHg   


 


Bedside Blood Gas HCO3 (LAB)  24 meq/L   


 


Bedside Blood Gas Total CO2  25 mEq/l   


 


Bedside Blood Gas Base Excess


(LAB) 


  -2.0 meq/L 


  


  


 


 


Bedside Blood Gas O2


Saturation 


  97.0 % 


  


  


 


 


Gigi Test  Pass   


 


Oxygen Delivery Device  Ventilator   


 


Bedside FiO2  30 %   


 


Blood Gas PEEP  5   











Diagnostic Results


Radiological imaging and reports were reviewed by myself.





Radiologist's Interpretation as follows:











CHEST ONE VIEW PORTABLE





CLINICAL HISTORY: intubated tube position





COMPARISON STUDY:  1/5/2017





FINDINGS: Endotracheal tube positioned 4.5 cm both rl. Prominent pulmonary


vasculature. No evidence for cardiac enlargement. Small parenchymal infiltrate


right base. 





IMPRESSION:  


1. Endotracheal tube 4.5 cm above the rl. 


2. Mild congestive failure.


3. Small parenchymal infiltrate versus atelectasis right base.














CHEST ONE VIEW PORTABLE





CLINICAL HISTORY: intubated/pneumonia    





COMPARISON STUDY:  8/10/2017





FINDINGS: The cardiac and mediastinal contours remain stable. There is a


nasogastric tube which passes into the stomach. Endotracheal tube is difficult


to visualize but appears to terminate 5 cm above the rl. There is improving


interstitial edema. There is a stable right basal airspace opacity, possibly


atelectatic.[ There are old left-sided rib fractures.





IMPRESSION: Improving interstitial edema











Assessment & Plan





(1) Acute respiratory failure


(2) COPD exacerbation


(3) Unresponsive episode


(4) Respiratory failure


(5) History of smoking greater than 50 pack years


(6) Hypoxia


(7) Elevated troponin


Reason Critically Ill: 71-year-old male with acute hypercapnic respiratory 

failure and episode of unresponsiveness currently intubated.





Neuro -


* CAM ICU: POSITIVE


* ATUL: -5


* Currently Sedated with Fentanyl and Propofol. - Will continue to wean both 

throughout the day.





Cardiac -


* Elevated Troponin.


 * Mild elevation of his troponin in the setting of acute respiratory failure 

likely suggests demand ischemia.  On review the patient's record, he has had 

elevation of his troponins in the past.  His EKG was unremarkable today 

suggesting no acute myocardial infarction. Will watch for any changes in 

cardiac status. CKD likely contributing as well.


* Home medications to be restarted when possible.





Respiratory -


* Hypercapnic Respiratory Failure with Acute COPD Exacerbation.


 * Initially acidotic with pH of 7.2/PCO2 69/PO2 >420/100% - ON VENTILATOR.


 * Agree with antibiotics and steroids.


 * Continue inhalers.


 * Wean FiO2 while closely monitoring ABGs.


 * Early extubation if tolerable.


 * Aggressive Pulmonary Toilet when extubated.


* Small RIGHT Base Infiltrate - Continue Antibiotics.


 * Follow w/ serial CXRs.





GI - 


* Agree with IV Protonix for Prophylaxis in patient with h/o gastropathy.


* Hold feedings for now pending today's assessment with weaning off vent.


   


RENAL/LYTES -


* CKD Stage III


 * Gentle IVFs.


* Correct Lytes as needed.





 - 


* Bethea Catheter for strict I&Os.


 * d/c as soon as possible.





ENDO - 


* No h/o DM/Thyroid Issues.


 * Monitor BSGs per protocol - treat as needed.





HEME -


* Stable H&H.


* Improving Leukocytosis in the setting of stress versus small RLL infiltrate.





ID -


* Leukocytosis and Small RLL infiltrate.


 * Will continue to cover with Cefepime/Levaquin given longstanding lung 

disease and new insult.


* Blood cultures pending.





LINES/IV ACCESS - 


* PIVs in place.


* Endotracheally intubated.





DVT PROPHYLAXIS -


* SQ Heparin.





I have personally spent 45 minutes of critical care time in the direct 

management of this patient. This is a life/limb threatening event. This 

includes time spent evaluating patient, direct bedside care, chart review, 

placing orders, interpretation of diagnostic studies, discussion with 

consultants, patient, and family members, as well as other required patient 

management activities. 


This time is exclusive of all separately billable procedures, and teaching time 

and separate from and in addition to any other critical care service time.





Thank you for this consultation allow us to be part of this patient's care.  

Please refer to my attending physician's documentation for any further 

recommendations.





Problem Qualifiers





(1) Acute respiratory failure:  


Respiratory failure complication:  hypercapnia  Qualified Codes:  J96.02 - 

Acute respiratory failure with hypercapnia

## 2017-08-11 NOTE — DIAGNOSTIC IMAGING REPORT
CHEST ONE VIEW PORTABLE



CLINICAL HISTORY: intubated/pneumonia    



COMPARISON STUDY:  8/10/2017



FINDINGS: The cardiac and mediastinal contours remain stable. There is a

nasogastric tube which passes into the stomach. Endotracheal tube is difficult

to visualize but appears to terminate 5 cm above the rl. There is improving

interstitial edema. There is a stable right basal airspace opacity, possibly

atelectatic.[ There are old left-sided rib fractures.



IMPRESSION: Improving interstitial edema







Electronically signed by:  Rodney Ramirez M.D.

8/11/2017 7:06 AM



Dictated Date/Time:  8/11/2017 7:02 AM

## 2017-08-11 NOTE — CLINICAL DOCUMENTATION QUERY
YEVGENIY Kearney :



**** CLINICAL DOCUMENTATION QUERIES****



QUERY 1 OF 2

Patient is a 71 year old male admitted for evaluation and treatment of acute respiratory 
failure.  Documentation includes "severe oxygen dependent COPD with ongoing smoking".  
Consider documentation as suggested below to capture the appropriate severity of illness 
and risk of mortality with specificity.  Thank you. 



In your clinical opinion is this patient being managed for:

    

                        (  ) Acute on chronic hypoxic respiratory failure 

                        (  ) Other explanation of clinical findings (Please Explain)

                        (  ) Unable to determine (Please Define)

                        (  ) Need to Discuss

                        (  ) Not Agree



The medical record reflects the following clinical findings, treatment, and risk factors.  
  



Clinical Indicators: As above

Treatment:  Escalated supplemental O2/delivery mechanism

Risk Factors: COPD, smoking, age



QUERY 2 OF 2

"Miimal bibasilar rales" noted on physical exam.  Possible pneumonia, aspiration component 
noted. Chest x-ray noted "mild congestive failure".  Patient was intubated, provided high 
FiO2 concentration, received IV Lasix, and is being monitored in ICU with intensivist 
consultation pending.  Echocardiogram from 1/6/17 demonstrated normal biventricular 
systolic function.  As appropriate, consider documentation as suggested below.  Thank you. 




In your clinical opinion is this patient being managed for:

    

                        (  ) Acute diastolic (congestive) heart failure

                        (  ) Other explanation of clinical findings (Please Explain)

                        (  ) Unable to determine (Please Define)

                        (  ) Need to Discuss

                        (  ) Not Agree



The medical record reflects the following clinical findings, treatment, and risk factors.  
  



Clinical Indicators: As above

Treatment:Patient was intubated, provided high FiO2 concentration, received IV Lasix, and 
is being monitored in ICU with intensivist consultation pending

Risk Factors:Age, CKD, hypertension, infection



Please clarify and document your clinical opinion in the progress notes and discharge 
summary. Terms such as "probable", "suspected", "likely", "questionable", "possible", or 
"still to be ruled out" are acceptable. 



*****IF IN AGREEMENT, YOU MUST DOCUMENT ABOVE DIAGNOSTIC STATEMENT IN DAILY PROGRESS NOTES 
AND DISCHARGE SUMMARY. This document is not part of the patient's record.*****

Thank You, Jethro Vidal, [phone]

## 2017-08-11 NOTE — PROGRESS NOTE
Medicine Progress Note


Date & Time of Visit:


Aug 11, 2017 at 19:54.


Delayed entry


date of service as noted above, seen around noon time


Subjective


patient seen intubated, on Diprivan


not in distress, sedated


being weaned off ventilator, on CPAP mode


no other issues per KATELYN Sher





Objective





Last 8 Hrs








  Date Time  Temp Pulse Resp B/P (MAP) Pulse Ox O2 Delivery O2 Flow Rate FiO2


 


8/11/17 19:40  94 18  96 Nasal Cannula 2.0 


 


8/11/17 17:45 36.5 88 18 179/87 (117) 99 Nasal Cannula 2.5 


 


8/11/17 16:15  87 18  100 Nasal Cannula 2.0 


 


8/11/17 16:01  84 16 182/88 (119) 99   


 


8/11/17 16:00  87 13  99   


 


8/11/17 16:00      Nasal Cannula 2.5 


 


8/11/17 15:33  88 19 181/77 (111) 99   


 


8/11/17 15:31  76 15 181/81 (114) 98   


 


8/11/17 15:30  87 17  98   


 


8/11/17 15:01  94 16 153/98 (116) 100   


 


8/11/17 15:00  85 20  93   


 


8/11/17 14:31  86 16 131/89 (103) 99   


 


8/11/17 14:30  88 22  99   


 


8/11/17 14:01  83 17 167/77 (107) 100   


 


8/11/17 14:00  85 15  99   


 


8/11/17 13:31  76 15 177/83 (114) 100   


 


8/11/17 13:30  78 17  100   


 


8/11/17 13:08  80 18  98 Nasal Cannula 3.0 


 


8/11/17 13:01  81 17 176/86 (116) 94   


 


8/11/17 13:00  80 16  96   


 


8/11/17 12:31  80 19 169/87 (114) 95   


 


8/11/17 12:30  79 15  95   


 


8/11/17 12:01  82 22 168/74 (105) 93   


 


8/11/17 12:00  79 16  93   








Physical Exam:





General- sedated, intubated, not in distress





Eyes-  anicteric





Neck- no JVD, no adenopathy





Lungs-diminished breath sounds bilaterally, mild rhonchi at the bases





Heart- regular rhythm; no murmur, normal rate





Abdomen- normal bowel sounds, soft, nontender





Extremities- no pretibial edema, no calf tenderness; peripheral pulses intact





Neuro- sedated, intubated





Skin- warm & dry


Laboratory Results:





Last 24 Hours








Test


  8/11/17


00:07 8/11/17


05:18 8/11/17


05:53 8/11/17


06:21


 


Bedside Glucose 170 mg/dl    150 mg/dl 


 


White Blood Count  9.71 K/uL   


 


Red Blood Count  4.14 M/uL   


 


Hemoglobin  11.8 g/dL   


 


Hematocrit  37.0 %   


 


Mean Corpuscular Volume  89.4 fL   


 


Mean Corpuscular Hemoglobin  28.5 pg   


 


Mean Corpuscular Hemoglobin


Concent 


  31.9 g/dl 


  


  


 


 


Platelet Count  197 K/uL   


 


Mean Platelet Volume  9.5 fL   


 


Neutrophils (%) (Auto)  86.6 %   


 


Lymphocytes (%) (Auto)  10.0 %   


 


Monocytes (%) (Auto)  2.7 %   


 


Eosinophils (%) (Auto)  0.1 %   


 


Basophils (%) (Auto)  0.1 %   


 


Neutrophils # (Auto)  8.41 K/uL   


 


Lymphocytes # (Auto)  0.97 K/uL   


 


Monocytes # (Auto)  0.26 K/uL   


 


Eosinophils # (Auto)  0.01 K/uL   


 


Basophils # (Auto)  0.01 K/uL   


 


RDW Standard Deviation  46.8 fL   


 


RDW Coefficient of Variation  14.2 %   


 


Immature Granulocyte % (Auto)  0.5 %   


 


Immature Granulocyte # (Auto)  0.05 K/uL   


 


Total Creatine Kinase  85 U/L   


 


Creatine Kinase MB  4.1 ng/ml   


 


Creatine Kinase MB Ratio  4.8   


 


Troponin I  0.215 ng/ml   


 


Blood Gas Sample Site   R Radial  


 


Bedside Blood Gas pH (LAB)   7.38  


 


Bedside Blood Gas pCO2 (LAB)   44 mmHg  


 


Bedside Blood Gas pO2 (LAB)   105 mmHg  


 


Bedside Blood Gas HCO3 (LAB)   26 meq/L  


 


Bedside Blood Gas Total CO2   28 mEq/l  


 


Bedside Blood Gas Base Excess


(LAB) 


  


  1.0 meq/L 


  


 


 


Bedside Blood Gas O2


Saturation 


  


  98.0 % 


  


 


 


Gigi Test   Pass  


 


Oxygen Delivery Device   Ventilator  


 


Bedside Oxygen Rate


(breaths/min) 


  


  18 


  


 


 


Blood Gas Minute Ventilation   11.1  


 


Bedside FiO2   40 %  


 


Blood Gas Tidal Volume   500  


 


Blood Gas PEEP   5  


 


Test


  8/11/17


10:44 8/11/17


10:53 8/11/17


11:21 8/11/17


11:24


 


Creatine Kinase MB Ratio     


 


Bedside Glucose  141 mg/dl   


 


Sodium Level   141 mmol/L  


 


Potassium Level   4.2 mmol/L  


 


Chloride Level   109 mmol/L  


 


Carbon Dioxide Level   26 mmol/L  


 


Anion Gap   6.0 mmol/L  


 


Blood Urea Nitrogen   31 mg/dl  


 


Creatinine   2.10 mg/dl  


 


Est Creatinine Clear Calc


Drug Dose 


  


  30.7 ml/min 


  


 


 


Estimated GFR (


American) 


  


  35.6 


  


 


 


Estimated GFR (Non-


American 


  


  30.7 


  


 


 


BUN/Creatinine Ratio   14.9  


 


Random Glucose   140 mg/dl  


 


Lactic Acid Level   2.0 mmol/L  


 


Calcium Level   7.9 mg/dl  


 


Creatine Kinase MB   3.9 ng/ml  


 


Troponin I   0.172 ng/ml  


 


Blood Gas Sample Site    R Radial 


 


Bedside Blood Gas pH (LAB)    7.35 


 


Bedside Blood Gas pCO2 (LAB)    43 mmHg 


 


Bedside Blood Gas pO2 (LAB)    96 mmHg 


 


Bedside Blood Gas HCO3 (LAB)    24 meq/L 


 


Bedside Blood Gas Total CO2    25 mEq/l 


 


Bedside Blood Gas Base Excess


(LAB) 


  


  


  -2.0 meq/L 


 


 


Bedside Blood Gas O2


Saturation 


  


  


  97.0 % 


 


 


Gigi Test    Pass 


 


Oxygen Delivery Device    Ventilator 


 


Bedside FiO2    30 % 


 


Blood Gas PEEP    5 














 Date/Time


Source Procedure


Growth Status


 


 


 8/10/17 20:06


Blood Blood Culture


Pending Received


 


 8/10/17 20:05


Blood Blood Culture


Pending Received











Assessment & Plan


IMPRESSION AND PLAN:


1.  Acute respiratory failure, Hypoxic, Hypercapneic


    likely from Aspiration or Community Acquired Pneumonia, COPD Exacerbation


-- intubated in the field


    ABG respiratory acidosis


-- IMPRESSION:  


   1. Endotracheal tube 4.5 cm above the rl. 


   2. Mild congestive failure.


   3. Small parenchymal infiltrate versus atelectasis right base.


-- on Cefepime, Levaquin


   Solumedrol 60 q6h


   Bronchodilators


-- [facility] Vent Management per Intensivist, appreciate the recommendations








2.  Chronic obstructive pulmonary disease exacerbation, most likely secondary


to pneumonia and or Aspiration.


-- on chronic prednisone


   currently on Solumedrol


-- management per #1





3.  Hypertension


-- usually on Amlodipine and Carvedilol


-- monitor





4.  Acute Renal Failure on CKD 3


-- on IV fluids


-- monitor





5.  Anxiety 


-- usually on Celexa





6.  Hyperlipidemia.  


-- usually on Atorvastatin





7.  Gastrointestinal prophylaxis with IV Protonix.


8.  Deep venous thrombosis prophylaxis with subcutaneous heparin.


9.  Code status:  He will be a full code.


 





Dispo


pending


will need PT/OT upon discharge


lives at home with family


Current Inpatient Medications:





Current Inpatient Medications








 Medications


  (Trade)  Dose


 Ordered  Sig/Gasper


 Route  Start Time


 Stop Time Status Last Admin


Dose Admin


 


 Heparin Sodium


  (Porcine)


  (Heparin Sq 5000


 Unit/0.5ml)  5,000 unit  Q8


 SQ  8/11/17 06:00


 9/10/17 05:59  8/11/17 14:02


5,000 UNIT


 


 Cefepime HCl 1000


 mg/Dextrose  111.3 ml @ 


 200 mls/hr  Q24H


 IV  8/11/17 19:00


 8/17/17 18:59  8/11/17 18:12


200 MLS/HR


 


 Levofloxacin 750


 mg/Prmx  150 ml @ 


 100 mls/hr  Q48H


 IV  8/12/17 20:00


 8/17/17 19:59   


 


 


 Metoprolol


 Tartrate


  (Lopressor Iv)  5 mg  Q6H  PRN


 IV  8/11/17 08:45


 9/10/17 08:44   


 


 


 Cefepime HCl


  (Consult)  1 ea  UD  PRN


 N/A  8/11/17 14:00


 9/10/17 13:59   


 


 


 Levofloxacin


  (Consult)  1 ea  UD  PRN


 N/A  8/11/17 14:00


 9/10/17 13:59   


 


 


 Methylprednisolone


 Sodium Succinate


 60 mg/Syringe  0.96 ml @ 


 1.5 mls/min  Q6H


 IV  8/11/17 18:00


 9/10/17 00:00  8/11/17 18:12


1.5 MLS/MIN


 


 Budesonide/


 Formoterol


 Fumarate


  (Symbicort 160/


 4.5 Inh)  2 puffs  BID


 INH  8/11/17 21:00


 9/10/17 20:59   


 


 


 Pantoprazole


 Sodium


  (Protonix Tab)  40 mg  QAM


 PO  8/12/17 09:00


 9/11/17 08:59   


 


 


 Amlodipine


 Besylate


  (Norvasc Tab)  10 mg  DAILY


 PO  8/12/17 09:00


 9/11/17 08:59  8/11/17 16:33


10 MG


 


 Albuterol/


 Ipratropium


  (Duoneb)  3 ml  Q4RWA


 INH  8/11/17 16:00


 9/10/17 15:59  8/11/17 19:40


3 ML


 


 Lorazepam


  (Ativan Tab)  1 mg  BID  PRN


 PO  8/11/17 15:45


 9/10/17 15:44   


 


 


 Sodium Chloride  1,000 ml @ 


 75 mls/hr  [id number]


 IV  8/11/17 18:00


 9/10/17 17:59  8/11/17 18:11


75 MLS/HR

## 2017-08-12 VITALS — DIASTOLIC BLOOD PRESSURE: 83 MMHG | SYSTOLIC BLOOD PRESSURE: 189 MMHG | OXYGEN SATURATION: 100 % | HEART RATE: 82 BPM

## 2017-08-12 VITALS
OXYGEN SATURATION: 95 % | TEMPERATURE: 98.24 F | HEART RATE: 110 BPM | SYSTOLIC BLOOD PRESSURE: 185 MMHG | DIASTOLIC BLOOD PRESSURE: 75 MMHG

## 2017-08-12 VITALS — HEART RATE: 96 BPM | DIASTOLIC BLOOD PRESSURE: 81 MMHG | OXYGEN SATURATION: 97 % | SYSTOLIC BLOOD PRESSURE: 190 MMHG

## 2017-08-12 VITALS
SYSTOLIC BLOOD PRESSURE: 179 MMHG | TEMPERATURE: 98.24 F | DIASTOLIC BLOOD PRESSURE: 78 MMHG | HEART RATE: 86 BPM | OXYGEN SATURATION: 97 %

## 2017-08-12 VITALS
SYSTOLIC BLOOD PRESSURE: 167 MMHG | OXYGEN SATURATION: 99 % | HEART RATE: 84 BPM | DIASTOLIC BLOOD PRESSURE: 71 MMHG | TEMPERATURE: 97.7 F

## 2017-08-12 VITALS — OXYGEN SATURATION: 97 % | SYSTOLIC BLOOD PRESSURE: 191 MMHG | HEART RATE: 93 BPM | DIASTOLIC BLOOD PRESSURE: 85 MMHG

## 2017-08-12 VITALS — SYSTOLIC BLOOD PRESSURE: 184 MMHG | OXYGEN SATURATION: 100 % | DIASTOLIC BLOOD PRESSURE: 83 MMHG | HEART RATE: 93 BPM

## 2017-08-12 VITALS — HEART RATE: 89 BPM | OXYGEN SATURATION: 96 %

## 2017-08-12 VITALS
SYSTOLIC BLOOD PRESSURE: 168 MMHG | TEMPERATURE: 97.7 F | DIASTOLIC BLOOD PRESSURE: 75 MMHG | HEART RATE: 79 BPM | OXYGEN SATURATION: 99 %

## 2017-08-12 VITALS
TEMPERATURE: 97.7 F | OXYGEN SATURATION: 95 % | DIASTOLIC BLOOD PRESSURE: 81 MMHG | SYSTOLIC BLOOD PRESSURE: 184 MMHG | HEART RATE: 99 BPM

## 2017-08-12 VITALS — HEART RATE: 86 BPM | OXYGEN SATURATION: 98 %

## 2017-08-12 VITALS
OXYGEN SATURATION: 98 % | HEART RATE: 93 BPM | DIASTOLIC BLOOD PRESSURE: 82 MMHG | TEMPERATURE: 98.24 F | SYSTOLIC BLOOD PRESSURE: 206 MMHG

## 2017-08-12 VITALS
DIASTOLIC BLOOD PRESSURE: 69 MMHG | HEART RATE: 86 BPM | SYSTOLIC BLOOD PRESSURE: 158 MMHG | OXYGEN SATURATION: 100 % | TEMPERATURE: 98.06 F

## 2017-08-12 VITALS — OXYGEN SATURATION: 99 % | HEART RATE: 84 BPM

## 2017-08-12 VITALS — OXYGEN SATURATION: 96 %

## 2017-08-12 VITALS — HEART RATE: 97 BPM | OXYGEN SATURATION: 94 %

## 2017-08-12 VITALS — DIASTOLIC BLOOD PRESSURE: 83 MMHG | HEART RATE: 99 BPM | OXYGEN SATURATION: 97 % | SYSTOLIC BLOOD PRESSURE: 189 MMHG

## 2017-08-12 VITALS — OXYGEN SATURATION: 99 %

## 2017-08-12 LAB
ALP SERPL-CCNC: 89 U/L (ref 45–117)
ALT SERPL-CCNC: 28 U/L (ref 12–78)
ANION GAP SERPL CALC-SCNC: 4 MMOL/L (ref 3–11)
AST SERPL-CCNC: 13 U/L (ref 15–37)
BASOPHILS # BLD: 0 K/UL (ref 0–0.2)
BASOPHILS NFR BLD: 0 %
BUN SERPL-MCNC: 30 MG/DL (ref 7–18)
BUN/CREAT SERPL: 17.8 (ref 10–20)
CALCIUM SERPL-MCNC: 8.1 MG/DL (ref 8.5–10.1)
CHLORIDE SERPL-SCNC: 112 MMOL/L (ref 98–107)
CO2 SERPL-SCNC: 27 MMOL/L (ref 21–32)
COMPLETE: YES
CREAT CL PREDICTED SERPL C-G-VRATE: 37.9 ML/MIN
CREAT SERPL-MCNC: 1.7 MG/DL (ref 0.6–1.4)
EOSINOPHIL NFR BLD AUTO: 192 K/UL (ref 130–400)
GLUCOSE SERPL-MCNC: 143 MG/DL (ref 70–99)
HCT VFR BLD CALC: 37.4 % (ref 42–52)
IG%: 0.3 %
IMM GRANULOCYTES NFR BLD AUTO: 5.9 %
LYMPHOCYTES # BLD: 0.9 K/UL (ref 1.2–3.4)
MAGNESIUM SERPL-MCNC: 2.1 MG/DL (ref 1.8–2.4)
MCH RBC QN AUTO: 28.1 PG (ref 25–34)
MCHC RBC AUTO-ENTMCNC: 31.8 G/DL (ref 32–36)
MCV RBC AUTO: 88.2 FL (ref 80–100)
MONOCYTES NFR BLD: 5.7 %
NEUTROPHILS # BLD AUTO: 0 %
NEUTROPHILS NFR BLD AUTO: 88.1 %
PHOSPHATE SERPL-MCNC: 3.2 MG/DL (ref 2.5–4.9)
PMV BLD AUTO: 10 FL (ref 7.4–10.4)
POTASSIUM SERPL-SCNC: 4.1 MMOL/L (ref 3.5–5.1)
RBC # BLD AUTO: 4.24 M/UL (ref 4.7–6.1)
SODIUM SERPL-SCNC: 143 MMOL/L (ref 136–145)
WBC # BLD AUTO: 15.32 K/UL (ref 4.8–10.8)

## 2017-08-12 RX ADMIN — LORAZEPAM PRN MG: 1 TABLET ORAL at 18:30

## 2017-08-12 RX ADMIN — SODIUM CHLORIDE SCH MLS/HR: 900 INJECTION, SOLUTION INTRAVENOUS at 13:52

## 2017-08-12 RX ADMIN — METHYLPREDNISOLONE SODIUM SUCCINATE SCH MLS/MIN: 1 INJECTION, POWDER, FOR SOLUTION INTRAMUSCULAR; INTRAVENOUS at 06:22

## 2017-08-12 RX ADMIN — METHYLPREDNISOLONE SODIUM SUCCINATE SCH MLS/MIN: 1 INJECTION, POWDER, FOR SOLUTION INTRAMUSCULAR; INTRAVENOUS at 13:53

## 2017-08-12 RX ADMIN — HEPARIN SODIUM SCH UNIT: 10000 INJECTION, SOLUTION INTRAVENOUS; SUBCUTANEOUS at 06:22

## 2017-08-12 RX ADMIN — CEFEPIME HYDROCHLORIDE SCH MLS/HR: 1 INJECTION, POWDER, FOR SOLUTION INTRAMUSCULAR; INTRAVENOUS at 18:30

## 2017-08-12 RX ADMIN — METHYLPREDNISOLONE SODIUM SUCCINATE SCH MLS/MIN: 1 INJECTION, POWDER, FOR SOLUTION INTRAMUSCULAR; INTRAVENOUS at 00:22

## 2017-08-12 RX ADMIN — HEPARIN SODIUM SCH UNIT: 10000 INJECTION, SOLUTION INTRAVENOUS; SUBCUTANEOUS at 14:21

## 2017-08-12 RX ADMIN — LABETALOL HCL SCH MG: 200 TABLET, FILM COATED ORAL at 13:51

## 2017-08-12 RX ADMIN — SODIUM CHLORIDE SCH MLS/HR: 900 INJECTION, SOLUTION INTRAVENOUS at 19:41

## 2017-08-12 RX ADMIN — HEPARIN SODIUM SCH UNIT: 10000 INJECTION, SOLUTION INTRAVENOUS; SUBCUTANEOUS at 21:20

## 2017-08-12 RX ADMIN — IPRATROPIUM BROMIDE AND ALBUTEROL SULFATE SCH ML: .5; 3 SOLUTION RESPIRATORY (INHALATION) at 10:44

## 2017-08-12 RX ADMIN — BUDESONIDE AND FORMOTEROL FUMARATE DIHYDRATE SCH PUFFS: 160; 4.5 AEROSOL RESPIRATORY (INHALATION) at 09:27

## 2017-08-12 RX ADMIN — IPRATROPIUM BROMIDE AND ALBUTEROL SULFATE SCH ML: .5; 3 SOLUTION RESPIRATORY (INHALATION) at 19:14

## 2017-08-12 RX ADMIN — IPRATROPIUM BROMIDE AND ALBUTEROL SULFATE SCH ML: .5; 3 SOLUTION RESPIRATORY (INHALATION) at 15:27

## 2017-08-12 RX ADMIN — CITALOPRAM HYDROBROMIDE SCH MG: 20 TABLET ORAL at 10:35

## 2017-08-12 RX ADMIN — METOPROLOL TARTRATE ONE MG: 25 TABLET, FILM COATED ORAL at 10:14

## 2017-08-12 RX ADMIN — LORAZEPAM PRN MG: 1 TABLET ORAL at 08:24

## 2017-08-12 RX ADMIN — METHYLPREDNISOLONE SODIUM SUCCINATE SCH MLS/MIN: 1 INJECTION, POWDER, FOR SOLUTION INTRAMUSCULAR; INTRAVENOUS at 21:21

## 2017-08-12 RX ADMIN — IPRATROPIUM BROMIDE AND ALBUTEROL SULFATE SCH ML: .5; 3 SOLUTION RESPIRATORY (INHALATION) at 07:11

## 2017-08-12 RX ADMIN — METOPROLOL TARTRATE ONE MG: 25 TABLET, FILM COATED ORAL at 10:08

## 2017-08-12 RX ADMIN — LABETALOL HCL SCH MG: 200 TABLET, FILM COATED ORAL at 19:42

## 2017-08-12 RX ADMIN — PANTOPRAZOLE SCH MG: 40 TABLET, DELAYED RELEASE ORAL at 09:25

## 2017-08-12 RX ADMIN — BUDESONIDE AND FORMOTEROL FUMARATE DIHYDRATE SCH PUFFS: 160; 4.5 AEROSOL RESPIRATORY (INHALATION) at 19:42

## 2017-08-12 NOTE — PROGRESS NOTE
Medicine Progress Note


Date & Time of Visit:


Aug 12, 2017 at 10:23.


Subjective


patient seen resting in bed, alert, oriented x 3


in good spirits


states he feels better


denies dyspnea on 2 L


has occasional productive cough


no chest pain


denies headache, dizziness, nausea, palpitations


no other symptoms





Objective





Last 8 Hrs








  Date Time  Temp Pulse Resp B/P (MAP) Pulse Ox O2 Delivery O2 Flow Rate FiO2


 


8/12/17 08:00 36.8 86 17 179/78 (111) 97 Nasal Cannula 2.0 


 


8/12/17 08:00     98 Nasal Cannula 2.0 


 


8/12/17 07:11  86 18  98 Nasal Cannula 2.0 


 


8/12/17 04:21     96 Nasal Cannula 2.0 


 


8/12/17 04:01  82 15 189/83 (118) 100 Nasal Cannula 2.0 


 


8/12/17 03:17  91  191/95    


 


8/12/17 03:01  93 16 191/85 (120) 97 Nasal Cannula 2.0 








Physical Exam:





General- oriented x 3, not in distress, speaks in sentences with no effort





Eyes-  anicteric





Neck- no JVD





Lungs- diminished breath sounds bilaterally, no wheezing, mild rales bilateral 

bases





Heart- regular rhythm; no murmur, normal rate





Abdomen- normal bowel sounds, soft, nontender





Extremities- no pretibial edema, no calf tenderness





Neuro- sedated, intubated





Skin- warm & dry


Laboratory Results:





Last 24 Hours








Test


  8/11/17


10:44 8/11/17


10:53 8/11/17


11:21 8/11/17


11:24


 


Creatine Kinase MB Ratio     


 


Bedside Glucose  141 mg/dl   


 


Sodium Level   141 mmol/L  


 


Potassium Level   4.2 mmol/L  


 


Chloride Level   109 mmol/L  


 


Carbon Dioxide Level   26 mmol/L  


 


Anion Gap   6.0 mmol/L  


 


Blood Urea Nitrogen   31 mg/dl  


 


Creatinine   2.10 mg/dl  


 


Est Creatinine Clear Calc


Drug Dose 


  


  30.7 ml/min 


  


 


 


Estimated GFR (


American) 


  


  35.6 


  


 


 


Estimated GFR (Non-


American 


  


  30.7 


  


 


 


BUN/Creatinine Ratio   14.9  


 


Random Glucose   140 mg/dl  


 


Lactic Acid Level   2.0 mmol/L  


 


Calcium Level   7.9 mg/dl  


 


Creatine Kinase MB   3.9 ng/ml  


 


Troponin I   0.172 ng/ml  


 


Blood Gas Sample Site    R Radial 


 


Bedside Blood Gas pH (LAB)    7.35 


 


Bedside Blood Gas pCO2 (LAB)    43 mmHg 


 


Bedside Blood Gas pO2 (LAB)    96 mmHg 


 


Bedside Blood Gas HCO3 (LAB)    24 meq/L 


 


Bedside Blood Gas Total CO2    25 mEq/l 


 


Bedside Blood Gas Base Excess


(LAB) 


  


  


  -2.0 meq/L 


 


 


Bedside Blood Gas O2


Saturation 


  


  


  97.0 % 


 


 


Gigi Test    Pass 


 


Oxygen Delivery Device    Ventilator 


 


Bedside FiO2    30 % 


 


Blood Gas PEEP    5 


 


Test


  8/12/17


00:17 8/12/17


04:48 8/12/17


06:05 


 


 


Bedside Glucose 131 mg/dl   128 mg/dl  


 


White Blood Count  15.32 K/uL   


 


Red Blood Count  4.24 M/uL   


 


Hemoglobin  11.9 g/dL   


 


Hematocrit  37.4 %   


 


Mean Corpuscular Volume  88.2 fL   


 


Mean Corpuscular Hemoglobin  28.1 pg   


 


Mean Corpuscular Hemoglobin


Concent 


  31.8 g/dl 


  


  


 


 


Platelet Count  192 K/uL   


 


Mean Platelet Volume  10.0 fL   


 


Neutrophils (%) (Auto)  88.1 %   


 


Lymphocytes (%) (Auto)  5.9 %   


 


Monocytes (%) (Auto)  5.7 %   


 


Eosinophils (%) (Auto)  0.0 %   


 


Basophils (%) (Auto)  0.0 %   


 


Neutrophils # (Auto)  13.49 K/uL   


 


Lymphocytes # (Auto)  0.90 K/uL   


 


Monocytes # (Auto)  0.88 K/uL   


 


Eosinophils # (Auto)  0.00 K/uL   


 


Basophils # (Auto)  0.00 K/uL   


 


RDW Standard Deviation  45.7 fL   


 


RDW Coefficient of Variation  14.2 %   


 


Immature Granulocyte % (Auto)  0.3 %   


 


Immature Granulocyte # (Auto)  0.05 K/uL   


 


Sodium Level  143 mmol/L   


 


Potassium Level  4.1 mmol/L   


 


Chloride Level  112 mmol/L   


 


Carbon Dioxide Level  27 mmol/L   


 


Anion Gap  4.0 mmol/L   


 


Blood Urea Nitrogen  30 mg/dl   


 


Creatinine  1.70 mg/dl   


 


Est Creatinine Clear Calc


Drug Dose 


  37.9 ml/min 


  


  


 


 


Estimated GFR (


American) 


  46.0 


  


  


 


 


Estimated GFR (Non-


American 


  39.7 


  


  


 


 


BUN/Creatinine Ratio  17.8   


 


Random Glucose  143 mg/dl   


 


Calcium Level  8.1 mg/dl   


 


Phosphorus Level  3.2 mg/dl   


 


Magnesium Level  2.1 mg/dl   


 


Total Bilirubin  0.3 mg/dl   


 


Direct Bilirubin  < 0.1 mg/dl   


 


Aspartate Amino Transf


(AST/SGOT) 


  13 U/L 


  


  


 


 


Alanine Aminotransferase


(ALT/SGPT) 


  28 U/L 


  


  


 


 


Alkaline Phosphatase  89 U/L   


 


Total Protein  6.2 gm/dl   


 


Albumin  3.1 gm/dl   











Assessment & Plan


IMPRESSION AND PLAN:


1.  Acute respiratory failure, Hypoxic, Hypercapneic


    likely from Aspiration or Community Acquired Pneumonia, COPD Exacerbation


-- intubated in the field


    ABG respiratory acidosis


-- IMPRESSION:  


   1. Endotracheal tube 4.5 cm above the rl. 


   2. Mild congestive failure.


   3. Small parenchymal infiltrate versus atelectasis right base.


-- off Ventilator 


  now on 2 liters nasal cannula





-- on Cefepime, Levaquin Day 3


   taper  Solumedrol 60 q6h to 40mg q8h


   Bronchodilators





-- speech therapy eval





-- appreciate Dr. Overton's recommendations





2.  Chronic obstructive pulmonary disease exacerbation, most likely secondary


to pneumonia and or Aspiration.


-- on chronic prednisone


   currently on Solumedrol


-- management per #1





3.  Hypertension


-- usually on Amlodipine and Carvedilol


-- BP elevated 


   asymptomatic


   likely from steroids


-- resumed Amlodipine


  avoid B Blockers due to COPD exacerbation


  will order Hydralazine 25mg BID


  monitor





4.  Acute Renal Failure on CKD 3


-- on IV fluids


-- improved to 1.7


   baseline 1.5





5.  Anxiety 


-- resume Celexa





6.  Hyperlipidemia.  


-- usually on Atorvastatin





7.  Gastrointestinal prophylaxis with IV Protonix.


8.  Deep venous thrombosis prophylaxis with subcutaneous heparin.


9.  Code status:  He will be a full code.


 





Dispo


pending


will need PT/OT upon discharge


lives at home with family


Current Inpatient Medications:





Current Inpatient Medications








 Medications


  (Trade)  Dose


 Ordered  Sig/Gasper


 Route  Start Time


 Stop Time Status Last Admin


Dose Admin


 


 Heparin Sodium


  (Porcine)


  (Heparin Sq 5000


 Unit/0.5ml)  5,000 unit  Q8


 SQ  8/11/17 06:00


 9/10/17 05:59  8/12/17 06:22


5,000 UNIT


 


 Cefepime HCl 1000


 mg/Dextrose  111.3 ml @ 


 200 mls/hr  Q24H


 IV  8/11/17 19:00


 8/17/17 18:59  8/11/17 18:12


200 MLS/HR


 


 Levofloxacin 750


 mg/Prmx  150 ml @ 


 100 mls/hr  Q48H


 IV  8/12/17 20:00


 8/17/17 19:59   


 


 


 Metoprolol


 Tartrate


  (Lopressor Iv)  5 mg  Q6H  PRN


 IV  8/11/17 08:45


 9/10/17 08:44  8/12/17 03:17


5 MG


 


 Cefepime HCl


  (Consult)  1 ea  UD  PRN


 N/A  8/11/17 14:00


 9/10/17 13:59   


 


 


 Levofloxacin


  (Consult)  1 ea  UD  PRN


 N/A  8/11/17 14:00


 9/10/17 13:59   


 


 


 Methylprednisolone


 Sodium Succinate


 60 mg/Syringe  0.96 ml @ 


 1.5 mls/min  Q6H


 IV  8/11/17 18:00


 9/10/17 00:00  8/12/17 06:22


1.5 MLS/MIN


 


 Budesonide/


 Formoterol


 Fumarate


  (Symbicort 160/


 4.5 Inh)  2 puffs  BID


 INH  8/11/17 21:00


 9/10/17 20:59  8/12/17 09:27


2 PUFFS


 


 Pantoprazole


 Sodium


  (Protonix Tab)  40 mg  QAM


 PO  8/12/17 09:00


 9/11/17 08:59  8/12/17 09:25


40 MG


 


 Amlodipine


 Besylate


  (Norvasc Tab)  10 mg  DAILY


 PO  8/12/17 09:00


 9/11/17 08:59  8/11/17 16:33


10 MG


 


 Albuterol/


 Ipratropium


  (Duoneb)  3 ml  Q4RWA


 INH  8/11/17 16:00


 9/10/17 15:59  8/12/17 07:11


3 ML


 


 Lorazepam


  (Ativan Tab)  1 mg  BID  PRN


 PO  8/11/17 15:45


 9/10/17 15:44  8/12/17 08:24


1 MG


 


 Sodium Chloride  1,000 ml @ 


 75 mls/hr  [id number]


 IV  8/11/17 18:00


 9/10/17 17:59  8/11/17 18:11


75 MLS/HR


 


 Citalopram


 Hydrobromide


  (celeXA TAB)  20 mg  DAILY


 PO  8/12/17 10:00


 9/11/17 09:59   


 


 


 Lorazepam


  (Ativan Tab)  1 mg  BID  PRN


 PO  8/12/17 09:30


 9/11/17 09:29   


 


 


 Metoprolol


 Tartrate


  (Lopressor Tab)  25 mg  BID


 PO  8/12/17 21:00


 9/11/17 20:59   


 


 


 Hydralazine HCl


  (HydrALAZINE INJ)  10 mg  Q4  PRN


 IV.  8/12/17 09:30


 9/11/17 09:29  8/12/17 10:07


10 MG

## 2017-08-12 NOTE — ECHOCARDIOGRAM REPORT
*NOTICE TO RECEIVING PARTY AGENCY**  This information is strictly Confidential and 
protected under [place] law.  [place] law prohibits you from making any further 
disclosure of this information unless further disclosure is expressly permitted by the 
written consent of the person to whom it pertains or is authorized by law.  A general 
authorization for the release of medical or other information is not sufficient for this 
purpose.  Hospital accepts no responsibility if the information is made available to any 
other person, INCLUDING THE PATIENT.



Interpretation Summary

  *  Name: ALY CHAU  Study Date: 2017 02:33 PM  BP: 168/74 mmHg

  *  MRN: [id number]  Patient Location: .[facility]\S\E103\S\1  HR: 80

  *  : 1945 ([birth year])  Gender: Male  Height: 68 in

  *  Age: 71 yrs  Ethnicity: [place]  Weight: 148 lb

  *  Ordering Physician: Matt Funez

  *  Referring Physician: Self, Referred

  *  Performed By: Elsa Azul RDCS

  *  Accession# [id number]  Account# [id number]

  *  Reason For Study: ELEVATED TROPONIN

  *  BSA: 1.8 m2

  *  -- Conclusions --

  *  Aortic valve sclerosis mild, without significant aortic valvular stenosis.

  *  There is mild concentric left ventricular hypertrophy.

  *  No regional wall motion abnormalities noted.

  *  Left ventricular systolic function is normal.

  *  The LV Ejection Fraction = 50-55%.

  *  Grade I diastolic dysfunction, (abnormal relaxation pattern).

  *  The right ventricular systolic function is normal as assessed by tricuspid annular 
plane systolic excursion (TAPSE) (normal >1.5 cm).

  *  There is no significant valvular heart disease.

Procedure Details

  *  A contrast injection of Definity was performed to improve assessment of LV function.

  *  Contrast was injected into an intravenous site in the left arm.

  *  One vial of Definity ultrasound contrast was diluted in normal saline to a total 
volume of 10 ml.  A total of '2' ml of solution was administered during imaging.

  *  Lot # [id number] of Definity utilized for procedure.

  *  Expiration date 1 OCT 18.

  *  The attending nurse who injected the contrast agent was LOC NGUYEN RN.

  *  A complete two-dimensional transthoracic echocardiogram was performed (2D, M-mode, 
Doppler and color flow Doppler).

Left Ventricle

  *  The left ventricle is normal in size.

  *  There is mild concentric left ventricular hypertrophy.

  *  Ejection Fraction = 50-55%.

  *  Left ventricular systolic function is normal.

  *  The left ventricular wall motion is normal.

  *  No regional wall motion abnormalities noted.

Right Ventricle

  *  The right ventricle is normal size.

  *  The right ventricular systolic function is normal as assessed by tricuspid annular 
plane systolic excursion (TAPSE) (normal >1.5 cm).

Atria

  *  The left atrial size is normal.

  *  Right atrial size is normal.

  *  There is no evidence of atrial septal defect, but resolution does not allow 
assessment for a patent foramen ovale.

Mitral Valve

  *  There is mild mitral annular calcification.

  *  There is no mitral valve stenosis.

  *  Significant mitral regurgitation is absent.

Tricuspid Valve

  *  The tricuspid valve is normal.

  *  There is no tricuspid stenosis.

  *  Significant tricuspid regurgitation is absent.

Aortic Valve

  *  The aortic valve is trileaflet.

  *  Aortic valve sclerosis mild, without significant aortic valvular stenosis.

  *  Aortic stenosis is absent.

  *  There is no significant aortic regurgitation.

Pulmonic Valve

  *  The pulmonary valve is not well seen, but the Doppler examination is normal without 
significant regurgitation or stenosis.

Great Vessels

  *  The aortic root and proximal ascending aorta are normal sized.

Pericardium/Pleural

  *  There is no pericardial effusion.

Great Vessels

  *  Normal inferior vena cava diameter and respiratory variation suggests normal central 
venous pressure.

Left Ventricular Diastolic Function

  *  Grade I diastolic dysfunction, (abnormal relaxation pattern).



MMode 2D Measurements and Calculations

IVSd 1.3 cm

IVSs 1.7 cm



LVIDd 4.6 cm

LVIDs 3.3 cm

LVPWd 1.3 cm

LVPWs 1.8 cm



IVS/LVPW 0.99 

FS 27.2 %

EDV(Teich) 95.1 ml

ESV(Teich) 44.7 ml

EF(Teich) 53.0 %



EDV(cubed) 94.5 ml

ESV(cubed) 36.5 ml

EF(cubed) 61.4 %

% IVS thick 34.9 %

% LVPW thick 42.8 %





LV mass(C)d 216.3 grams

LV mass(C)dI 120.3 grams/m\S\2

LV mass(C)s 233.5 grams

LV mass(C)sI 129.8 grams/m\S\2



SV(Teich) 50.4 ml

SI(Teich) 28.0 ml/m\S\2

SV(cubed) 58.0 ml

SI(cubed) 32.2 ml/m\S\2



LA dimension 3.8 cm



LVAd ap4 28.1 cm\S\2

LVLd ap4 8.7 cm

EDV(MOD-sp4) 75.8 ml

LVAs ap4 18.0 cm\S\2

LVLs ap4 8.0 cm

ESV(MOD-sp4) 35.9 ml

EF(MOD-sp4) 52.6 %





LVAd ap2 29.7 cm\S\2

LVLd ap2 8.7 cm

EDV(MOD-sp2) 88.0 ml

LVAs ap2 18.9 cm\S\2

LVLs ap2 7.7 cm

ESV(MOD-sp2) 42.2 ml

EF(MOD-sp2) 52.0 %



SV(MOD-sp4) 39.9 ml

SI(MOD-sp4) 22.2 ml/m\S\2



SV(MOD-sp2) 45.8 ml

SI(MOD-sp2) 25.5 ml/m\S\2









Doppler Measurements and Calculations

MV E max amy 62.6 cm/sec

MV A max amy 116.9 cm/sec



MV E/A 0.54 



MV dec time 0.24 sec



Ao V2 max 109.6 cm/sec

Ao max PG 4.8 mmHg

Ao max PG (full) 2.2 mmHg





LV V1 max PG 2.6 mmHg



LV V1 max 80.5 cm/sec
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
## 2017-08-12 NOTE — DIAGNOSTIC IMAGING REPORT
CHEST ONE VIEW PORTABLE



HISTORY:      intubated/pneumonia



COMPARISON: Chest 8/11/2017.



FINDINGS: The endotracheal tube is been removed. The heart is normal in size.

Emphysema. Old, healed left-sided rib fractures. The nasogastric tube is been

removed. No evidence for pulmonary edema at this time. No pleural effusions. No

pneumothorax. The right basilar linear density favors atelectasis or scarring.

No new focal lung consolidation.



IMPRESSION:

Endotracheal tube and nasogastric tubes have been removed. No evidence for

pulmonary edema.. Stable linear scarlike density within the right lung base.







Electronically signed by:  Juan Miguel Modi M.D.

8/12/2017 8:05 AM



Dictated Date/Time:  8/12/2017 8:04 AM

## 2017-08-13 VITALS
DIASTOLIC BLOOD PRESSURE: 71 MMHG | TEMPERATURE: 97.7 F | SYSTOLIC BLOOD PRESSURE: 151 MMHG | HEART RATE: 67 BPM | OXYGEN SATURATION: 97 %

## 2017-08-13 VITALS — HEART RATE: 84 BPM | OXYGEN SATURATION: 98 %

## 2017-08-13 VITALS
SYSTOLIC BLOOD PRESSURE: 166 MMHG | OXYGEN SATURATION: 94 % | HEART RATE: 85 BPM | TEMPERATURE: 97.7 F | DIASTOLIC BLOOD PRESSURE: 71 MMHG

## 2017-08-13 VITALS
OXYGEN SATURATION: 98 % | SYSTOLIC BLOOD PRESSURE: 175 MMHG | HEART RATE: 83 BPM | DIASTOLIC BLOOD PRESSURE: 76 MMHG | TEMPERATURE: 97.88 F

## 2017-08-13 VITALS
SYSTOLIC BLOOD PRESSURE: 173 MMHG | DIASTOLIC BLOOD PRESSURE: 65 MMHG | TEMPERATURE: 98.24 F | HEART RATE: 92 BPM | OXYGEN SATURATION: 96 %

## 2017-08-13 VITALS
SYSTOLIC BLOOD PRESSURE: 183 MMHG | TEMPERATURE: 97.88 F | DIASTOLIC BLOOD PRESSURE: 83 MMHG | OXYGEN SATURATION: 97 % | HEART RATE: 78 BPM

## 2017-08-13 VITALS — HEART RATE: 72 BPM | OXYGEN SATURATION: 97 %

## 2017-08-13 VITALS
SYSTOLIC BLOOD PRESSURE: 156 MMHG | TEMPERATURE: 97.52 F | DIASTOLIC BLOOD PRESSURE: 76 MMHG | HEART RATE: 82 BPM | OXYGEN SATURATION: 97 %

## 2017-08-13 VITALS — HEART RATE: 88 BPM | OXYGEN SATURATION: 96 %

## 2017-08-13 LAB
ALP SERPL-CCNC: 73 U/L (ref 45–117)
ALT SERPL-CCNC: 22 U/L (ref 12–78)
ANION GAP SERPL CALC-SCNC: 9 MMOL/L (ref 3–11)
AST SERPL-CCNC: 15 U/L (ref 15–37)
BASOPHILS # BLD: 0 K/UL (ref 0–0.2)
BASOPHILS NFR BLD: 0 %
BUN SERPL-MCNC: 39 MG/DL (ref 7–18)
BUN/CREAT SERPL: 24.6 (ref 10–20)
CALCIUM SERPL-MCNC: 7.9 MG/DL (ref 8.5–10.1)
CHLORIDE SERPL-SCNC: 111 MMOL/L (ref 98–107)
CO2 SERPL-SCNC: 23 MMOL/L (ref 21–32)
COMPLETE: YES
CREAT CL PREDICTED SERPL C-G-VRATE: 40.4 ML/MIN
CREAT SERPL-MCNC: 1.6 MG/DL (ref 0.6–1.4)
EOSINOPHIL NFR BLD AUTO: 166 K/UL (ref 130–400)
GLUCOSE SERPL-MCNC: 129 MG/DL (ref 70–99)
HCT VFR BLD CALC: 33.6 % (ref 42–52)
IG%: 0.2 %
IMM GRANULOCYTES NFR BLD AUTO: 7.1 %
LYMPHOCYTES # BLD: 0.86 K/UL (ref 1.2–3.4)
MAGNESIUM SERPL-MCNC: 2 MG/DL (ref 1.8–2.4)
MCH RBC QN AUTO: 28.7 PG (ref 25–34)
MCHC RBC AUTO-ENTMCNC: 32.1 G/DL (ref 32–36)
MCV RBC AUTO: 89.4 FL (ref 80–100)
MONOCYTES NFR BLD: 7.8 %
NEUTROPHILS # BLD AUTO: 0 %
NEUTROPHILS NFR BLD AUTO: 84.9 %
PHOSPHATE SERPL-MCNC: 3.7 MG/DL (ref 2.5–4.9)
PMV BLD AUTO: 9.8 FL (ref 7.4–10.4)
POTASSIUM SERPL-SCNC: 4.1 MMOL/L (ref 3.5–5.1)
RBC # BLD AUTO: 3.76 M/UL (ref 4.7–6.1)
SODIUM SERPL-SCNC: 143 MMOL/L (ref 136–145)
WBC # BLD AUTO: 12.15 K/UL (ref 4.8–10.8)

## 2017-08-13 RX ADMIN — HEPARIN SODIUM SCH UNIT: 10000 INJECTION, SOLUTION INTRAVENOUS; SUBCUTANEOUS at 20:28

## 2017-08-13 RX ADMIN — IPRATROPIUM BROMIDE AND ALBUTEROL SULFATE SCH ML: .5; 3 SOLUTION RESPIRATORY (INHALATION) at 11:16

## 2017-08-13 RX ADMIN — HEPARIN SODIUM SCH UNIT: 10000 INJECTION, SOLUTION INTRAVENOUS; SUBCUTANEOUS at 06:28

## 2017-08-13 RX ADMIN — BUDESONIDE AND FORMOTEROL FUMARATE DIHYDRATE SCH PUFFS: 160; 4.5 AEROSOL RESPIRATORY (INHALATION) at 08:11

## 2017-08-13 RX ADMIN — PANTOPRAZOLE SCH MG: 40 TABLET, DELAYED RELEASE ORAL at 08:11

## 2017-08-13 RX ADMIN — BUDESONIDE AND FORMOTEROL FUMARATE DIHYDRATE SCH PUFFS: 160; 4.5 AEROSOL RESPIRATORY (INHALATION) at 20:27

## 2017-08-13 RX ADMIN — LABETALOL HCL SCH MG: 200 TABLET, FILM COATED ORAL at 20:27

## 2017-08-13 RX ADMIN — METHYLPREDNISOLONE SODIUM SUCCINATE SCH MLS/MIN: 1 INJECTION, POWDER, FOR SOLUTION INTRAMUSCULAR; INTRAVENOUS at 06:10

## 2017-08-13 RX ADMIN — IPRATROPIUM BROMIDE AND ALBUTEROL SULFATE SCH ML: .5; 3 SOLUTION RESPIRATORY (INHALATION) at 15:29

## 2017-08-13 RX ADMIN — LORAZEPAM PRN MG: 1 TABLET ORAL at 20:32

## 2017-08-13 RX ADMIN — IPRATROPIUM BROMIDE AND ALBUTEROL SULFATE SCH ML: .5; 3 SOLUTION RESPIRATORY (INHALATION) at 06:59

## 2017-08-13 RX ADMIN — HEPARIN SODIUM SCH UNIT: 10000 INJECTION, SOLUTION INTRAVENOUS; SUBCUTANEOUS at 14:24

## 2017-08-13 RX ADMIN — CITALOPRAM HYDROBROMIDE SCH MG: 20 TABLET ORAL at 08:11

## 2017-08-13 RX ADMIN — AMOXICILLIN AND CLAVULANATE POTASSIUM SCH MG: 875; 125 TABLET, FILM COATED ORAL at 20:28

## 2017-08-13 RX ADMIN — LABETALOL HCL SCH MG: 200 TABLET, FILM COATED ORAL at 08:11

## 2017-08-13 RX ADMIN — SODIUM CHLORIDE SCH MLS/HR: 900 INJECTION, SOLUTION INTRAVENOUS at 11:10

## 2017-08-13 RX ADMIN — AMLODIPINE BESYLATE SCH MG: 5 TABLET ORAL at 08:11

## 2017-08-13 RX ADMIN — METHYLPREDNISOLONE SODIUM SUCCINATE SCH MLS/MIN: 1 INJECTION, POWDER, FOR SOLUTION INTRAMUSCULAR; INTRAVENOUS at 14:22

## 2017-08-13 RX ADMIN — IPRATROPIUM BROMIDE AND ALBUTEROL SULFATE SCH ML: .5; 3 SOLUTION RESPIRATORY (INHALATION) at 19:27

## 2017-08-13 NOTE — PROGRESS NOTE
Medicine Progress Note


Date & Time of Visit:


Aug 13, 2017 at 18:31.


Subjective


patient seen resting in bed, comfortable


states he feels improved today compared to yesterday


has intermittent cough


denies chest pain, palpitations, dizziness, headache


in good spirits


denies other symptoms





Objective





Last 8 Hrs








  Date Time  Temp Pulse Resp B/P (MAP) Pulse Ox O2 Delivery O2 Flow Rate FiO2


 


8/13/17 16:00      Nasal Cannula 3.5 


 


8/13/17 15:29  84 18  98 Nasal Cannula 3.0 


 


8/13/17 15:09 36.5 85 20 166/71 (102) 94 Nasal Cannula 3.0 


 


8/13/17 12:00      Nasal Cannula 3.5 


 


8/13/17 11:25 36.5 67 19 151/71 (97) 97 Nasal Cannula 2.0 


 


8/13/17 11:16  88 18  96 Nasal Cannula 3.0 








Physical Exam:





General- oriented x 3, not in distress, speaks in sentences with no effort





Neck- no JVD





Lungs- diminished breath sounds bilaterally, mild wheeze bilateral bases





Heart- regular rhythm; no murmur, normal rate





Abdomen- normal bowel sounds, soft, nontender





Extremities- no pretibial edema, no calf tenderness





Neuro- oriented x 3; no gross focal deficits





Skin- warm & dry


Laboratory Results:





Last 24 Hours








Test


  8/13/17


06:19


 


White Blood Count 12.15 K/uL 


 


Red Blood Count 3.76 M/uL 


 


Hemoglobin 10.8 g/dL 


 


Hematocrit 33.6 % 


 


Mean Corpuscular Volume 89.4 fL 


 


Mean Corpuscular Hemoglobin 28.7 pg 


 


Mean Corpuscular Hemoglobin


Concent 32.1 g/dl 


 


 


Platelet Count 166 K/uL 


 


Mean Platelet Volume 9.8 fL 


 


Neutrophils (%) (Auto) 84.9 % 


 


Lymphocytes (%) (Auto) 7.1 % 


 


Monocytes (%) (Auto) 7.8 % 


 


Eosinophils (%) (Auto) 0.0 % 


 


Basophils (%) (Auto) 0.0 % 


 


Neutrophils # (Auto) 10.31 K/uL 


 


Lymphocytes # (Auto) 0.86 K/uL 


 


Monocytes # (Auto) 0.95 K/uL 


 


Eosinophils # (Auto) 0.00 K/uL 


 


Basophils # (Auto) 0.00 K/uL 


 


RDW Standard Deviation 47.9 fL 


 


RDW Coefficient of Variation 14.6 % 


 


Immature Granulocyte % (Auto) 0.2 % 


 


Immature Granulocyte # (Auto) 0.03 K/uL 


 


Sodium Level 143 mmol/L 


 


Potassium Level 4.1 mmol/L 


 


Chloride Level 111 mmol/L 


 


Carbon Dioxide Level 23 mmol/L 


 


Anion Gap 9.0 mmol/L 


 


Blood Urea Nitrogen 39 mg/dl 


 


Creatinine 1.60 mg/dl 


 


Est Creatinine Clear Calc


Drug Dose 40.4 ml/min 


 


 


Estimated GFR (


American) 49.5 


 


 


Estimated GFR (Non-


American 42.7 


 


 


BUN/Creatinine Ratio 24.6 


 


Random Glucose 129 mg/dl 


 


Calcium Level 7.9 mg/dl 


 


Phosphorus Level 3.7 mg/dl 


 


Magnesium Level 2.0 mg/dl 


 


Total Bilirubin 0.3 mg/dl 


 


Direct Bilirubin < 0.1 mg/dl 


 


Aspartate Amino Transf


(AST/SGOT) 15 U/L 


 


 


Alanine Aminotransferase


(ALT/SGPT) 22 U/L 


 


 


Alkaline Phosphatase 73 U/L 


 


Total Protein 5.7 gm/dl 


 


Albumin 2.8 gm/dl 











Assessment & Plan


IMPRESSION AND PLAN:


1.  Acute respiratory failure, Hypoxic, Hypercapneic


    likely from Aspiration or Community Acquired Pneumonia, COPD Exacerbation


-- intubated in the field


    ABG respiratory acidosis


-- IMPRESSION:  


   1. Endotracheal tube 4.5 cm above the rl. 


   2. Mild congestive failure.


   3. Small parenchymal infiltrate versus atelectasis right base.


-- off Ventilator 


  now on 2 liters nasal cannula





-- on Cefepime, Levaquin Day 4--> transition to Augmentin and Levaquin PO





   taper  Solumedrol 60 q6h to 40mg q8h to q12h





   Bronchodilators and Symbicort





-- speech therapy eval





2.  Chronic obstructive pulmonary disease exacerbation, most likely secondary


to pneumonia and or Aspiration.


-- on chronic prednisone


   currently on Solumedrol


-- management per #1





3.  Hypertension


-- usually on Amlodipine and Carvedilol


-- BP improving


   asymptomatic


   likely from steroids


-- resumed Amlodipine


   on Labetalol + Hydralazine


   usually on Carvedilol, monitor





4.  Acute Renal Failure on CKD 3


-- improved to 1.6


   baseline 1.5


-- dc fluids





5.  Anxiety 


-- resumed Celexa





6.  Hyperlipidemia.  


-- usually on Atorvastatin





7.  Gastrointestinal prophylaxis with IV Protonix.


8.  Deep venous thrombosis prophylaxis with subcutaneous heparin.


9.  Code status:  He will be a full code.


 





Dispo


pending


will need PT/OT upon discharge


lives at home with family


Current Inpatient Medications:





Current Inpatient Medications








 Medications


  (Trade)  Dose


 Ordered  Sig/Gasper


 Route  Start Time


 Stop Time Status Last Admin


Dose Admin


 


 Heparin Sodium


  (Porcine)


  (Heparin Sq 5000


 Unit/0.5ml)  5,000 unit  Q8


 SQ  8/11/17 06:00


 9/10/17 05:59  8/13/17 14:24


5,000 UNIT


 


 Metoprolol


 Tartrate


  (Lopressor Iv)  5 mg  Q6H  PRN


 IV  8/11/17 08:45


 9/10/17 08:44  8/12/17 03:17


5 MG


 


 Budesonide/


 Formoterol


 Fumarate


  (Symbicort 160/


 4.5 Inh)  2 puffs  BID


 INH  8/11/17 21:00


 9/10/17 20:59  8/13/17 08:11


2 PUFFS


 


 Pantoprazole


 Sodium


  (Protonix Tab)  40 mg  QAM


 PO  8/12/17 09:00


 9/11/17 08:59  8/13/17 08:11


40 MG


 


 Amlodipine


 Besylate


  (Norvasc Tab)  10 mg  DAILY


 PO  8/12/17 09:00


 9/11/17 08:59  8/13/17 08:11


10 MG


 


 Albuterol/


 Ipratropium


  (Duoneb)  3 ml  Q4RWA


 INH  8/11/17 16:00


 9/10/17 15:59  8/13/17 15:29


3 ML


 


 Lorazepam


  (Ativan Tab)  1 mg  BID  PRN


 PO  8/11/17 15:45


 9/10/17 15:44  8/12/17 18:30


1 MG


 


 Citalopram


 Hydrobromide


  (celeXA TAB)  20 mg  DAILY


 PO  8/12/17 10:00


 9/11/17 09:59  8/13/17 08:11


20 MG


 


 Lorazepam


  (Ativan Tab)  1 mg  BID  PRN


 PO  8/12/17 09:30


 9/11/17 09:29   


 


 


 Hydralazine HCl


  (Apresoline Tab)  25 mg  BID


 PO  8/12/17 21:00


 9/11/17 20:59  8/13/17 08:10


25 MG


 


 Hydralazine HCl


  (HydrALAZINE INJ)  20 mg  Q4  PRN


 IV.  8/12/17 16:00


 9/11/17 09:29   


 


 


 Labetalol HCl


  (Normodyne Tab)  400 mg  BID


 PO  8/12/17 13:00


 9/11/17 12:59  8/13/17 08:11


400 MG


 


 Methylprednisolone


 Sodium Succinate


 40 mg/Syringe  0.64 ml @ 


 1.5 mls/min  Q12H


 IV  8/14/17 02:00


 9/11/17 13:59   


 


 


 Amoxicillin/


 Clavulanate


 Potassium


  (Augmentin Tab)  875 mg  BIDM


 PO  8/13/17 19:00


 8/20/17 18:59   


 


 


 Levofloxacin


  (Levaquin Tab)  500 mg  DAILY@11


 PO  8/14/17 11:00


 8/21/17 10:59 UNV

## 2017-08-14 VITALS
DIASTOLIC BLOOD PRESSURE: 82 MMHG | HEART RATE: 84 BPM | TEMPERATURE: 98.42 F | OXYGEN SATURATION: 95 % | SYSTOLIC BLOOD PRESSURE: 160 MMHG

## 2017-08-14 VITALS — HEART RATE: 74 BPM | OXYGEN SATURATION: 98 %

## 2017-08-14 VITALS
HEART RATE: 74 BPM | TEMPERATURE: 97.88 F | DIASTOLIC BLOOD PRESSURE: 68 MMHG | OXYGEN SATURATION: 98 % | SYSTOLIC BLOOD PRESSURE: 147 MMHG

## 2017-08-14 VITALS
SYSTOLIC BLOOD PRESSURE: 181 MMHG | HEART RATE: 79 BPM | DIASTOLIC BLOOD PRESSURE: 80 MMHG | TEMPERATURE: 97.7 F | OXYGEN SATURATION: 98 %

## 2017-08-14 VITALS
TEMPERATURE: 97.88 F | HEART RATE: 75 BPM | DIASTOLIC BLOOD PRESSURE: 68 MMHG | OXYGEN SATURATION: 98 % | SYSTOLIC BLOOD PRESSURE: 147 MMHG

## 2017-08-14 VITALS — OXYGEN SATURATION: 98 % | HEART RATE: 67 BPM

## 2017-08-14 VITALS — SYSTOLIC BLOOD PRESSURE: 150 MMHG | DIASTOLIC BLOOD PRESSURE: 72 MMHG

## 2017-08-14 LAB
ALP SERPL-CCNC: 70 U/L (ref 45–117)
ALT SERPL-CCNC: 21 U/L (ref 12–78)
ANION GAP SERPL CALC-SCNC: 7 MMOL/L (ref 3–11)
AST SERPL-CCNC: 13 U/L (ref 15–37)
BASOPHILS # BLD: 0 K/UL (ref 0–0.2)
BASOPHILS NFR BLD: 0 %
BUN SERPL-MCNC: 49 MG/DL (ref 7–18)
BUN/CREAT SERPL: 27.2 (ref 10–20)
CALCIUM SERPL-MCNC: 7.7 MG/DL (ref 8.5–10.1)
CHLORIDE SERPL-SCNC: 114 MMOL/L (ref 98–107)
CO2 SERPL-SCNC: 24 MMOL/L (ref 21–32)
COMPLETE: YES
CREAT CL PREDICTED SERPL C-G-VRATE: 36 ML/MIN
CREAT SERPL-MCNC: 1.8 MG/DL (ref 0.6–1.4)
EOSINOPHIL NFR BLD AUTO: 155 K/UL (ref 130–400)
GLUCOSE SERPL-MCNC: 135 MG/DL (ref 70–99)
HCT VFR BLD CALC: 34.6 % (ref 42–52)
IG%: 0.5 %
IMM GRANULOCYTES NFR BLD AUTO: 10.1 %
LYMPHOCYTES # BLD: 1.11 K/UL (ref 1.2–3.4)
MAGNESIUM SERPL-MCNC: 2.3 MG/DL (ref 1.8–2.4)
MCH RBC QN AUTO: 27.8 PG (ref 25–34)
MCHC RBC AUTO-ENTMCNC: 31.2 G/DL (ref 32–36)
MCV RBC AUTO: 89.2 FL (ref 80–100)
MONOCYTES NFR BLD: 1.6 %
NEUTROPHILS # BLD AUTO: 0 %
NEUTROPHILS NFR BLD AUTO: 87.8 %
PHOSPHATE SERPL-MCNC: 3.1 MG/DL (ref 2.5–4.9)
PMV BLD AUTO: 9.9 FL (ref 7.4–10.4)
POTASSIUM SERPL-SCNC: 4.3 MMOL/L (ref 3.5–5.1)
RBC # BLD AUTO: 3.88 M/UL (ref 4.7–6.1)
SODIUM SERPL-SCNC: 145 MMOL/L (ref 136–145)
WBC # BLD AUTO: 10.94 K/UL (ref 4.8–10.8)

## 2017-08-14 RX ADMIN — PANTOPRAZOLE SCH MG: 40 TABLET, DELAYED RELEASE ORAL at 08:08

## 2017-08-14 RX ADMIN — IPRATROPIUM BROMIDE AND ALBUTEROL SULFATE SCH ML: .5; 3 SOLUTION RESPIRATORY (INHALATION) at 11:06

## 2017-08-14 RX ADMIN — BUDESONIDE AND FORMOTEROL FUMARATE DIHYDRATE SCH PUFFS: 160; 4.5 AEROSOL RESPIRATORY (INHALATION) at 08:09

## 2017-08-14 RX ADMIN — HEPARIN SODIUM SCH UNIT: 10000 INJECTION, SOLUTION INTRAVENOUS; SUBCUTANEOUS at 05:45

## 2017-08-14 RX ADMIN — METHYLPREDNISOLONE SODIUM SUCCINATE SCH MLS/MIN: 1 INJECTION, POWDER, FOR SOLUTION INTRAMUSCULAR; INTRAVENOUS at 14:47

## 2017-08-14 RX ADMIN — HEPARIN SODIUM SCH UNIT: 10000 INJECTION, SOLUTION INTRAVENOUS; SUBCUTANEOUS at 14:00

## 2017-08-14 RX ADMIN — CITALOPRAM HYDROBROMIDE SCH MG: 20 TABLET ORAL at 08:08

## 2017-08-14 RX ADMIN — AMLODIPINE BESYLATE SCH MG: 5 TABLET ORAL at 08:08

## 2017-08-14 RX ADMIN — IPRATROPIUM BROMIDE AND ALBUTEROL SULFATE SCH ML: .5; 3 SOLUTION RESPIRATORY (INHALATION) at 07:07

## 2017-08-14 RX ADMIN — METHYLPREDNISOLONE SODIUM SUCCINATE SCH MLS/MIN: 1 INJECTION, POWDER, FOR SOLUTION INTRAMUSCULAR; INTRAVENOUS at 01:14

## 2017-08-14 RX ADMIN — AMOXICILLIN AND CLAVULANATE POTASSIUM SCH MG: 875; 125 TABLET, FILM COATED ORAL at 08:08

## 2017-08-14 RX ADMIN — LABETALOL HCL SCH MG: 200 TABLET, FILM COATED ORAL at 08:08

## 2017-08-14 RX ADMIN — IPRATROPIUM BROMIDE AND ALBUTEROL SULFATE SCH ML: .5; 3 SOLUTION RESPIRATORY (INHALATION) at 14:19

## 2017-08-14 NOTE — PULMONARY CONSULTATION
History


General


Date of Service:


Aug 14, 2017.


Stated Complaint:


Acute Respiratory Failure, Copd Exacerbation





HPI


The patient is a 71 year old male who presents to [facility] 

with complaints of Acute Respiratory Failure, Copd Exacerbation. The patient's 

primary care provider is Raul Knutson D.O..





Mr Morales is  71 year old male who carries the diagnosis of COPD on 3L LTOT who 

presented on 8/10/2017 with acute hypoxic hypercapnic failure  15 minutes after 

eating dinner. He states that he was in his normal state of health up until 

that time. Earlier in the day, he was burning "brush" with family. He denies 

any fever, chills, chest pain, palpitations or lower extremity edema. He denies 

any sick contacts or recent travel. He states that he has chronic shortness of 

breath and dyspnea on exertion. He has intermittent wheezing. He currently does 

not follow up with a pulmonary physician. He uses albuterol, ipratropium 

nebulizers and Symbicort as an outpatient. He states that he took Spiriva at 

some point, but choked on the pill. Per wife, this "episode" was different from 

previous episodes of COPD. He desaturated quickly on home pulse oximetry from 80

's to 60's.  She called EMS immediately and then he became unresponsive. Upon 

their arrival,   he was intubated in the field. 


Upon arrival to the ER, he was hemodynamically stable. His ABG showed pH 7.20 

and CO2 level of 69.  He was empirically started on Levaquin and Zosyn, IV 

corticosteroids and bronchodilators.


Patient admitted to the ICU for acute hypercapnic hypercapnic respiratory 

failure secondary to COPD exacerbation, acute kidney injury, and troponemia. 


He was subsequently extubated one day later on  8/11/2017 to 3LNC.





In the last 24 hours, his VS were  /68 to 180/80, P 67-84, RR 18-20, SaO2 

95-98% on RA.Labs reviewed. Significant for creatinine of 1.80 today, peaked at 

2.10. Also Troponin  0.049-->0.215,-->0.172, BNP 4654. His WBC is 10 today.


Blood cultures from  8/10/2017 show no growth to date. Sputum cultures from 8/12 /2017--no growth to date. He has been switched to Levaquin and Augmentin.From A 

respiratory standpoint, he remains on Duoneb q4h, Symbicort 160/4.5 2 puffs BID 

and Solumedrol 40 mg q12h. Per patient, he denies adamantly denies tobacco use, 

but wife states that he is an active user.





He feels like he is back baseline and would like to go home.





Review of Systems


Constitutional:  reports: no symptoms


Eyes:  reports: no symptoms


ENT:  reports: no symptoms


Cardiovascular:  reports: no symptoms


Respiratory:  reports: shortness of breath, wheezing


Gastrointestinal:  reports: no symptoms


Musculoskeletal:  reports: no symptoms





Past Medical History


Past Medical History:


Significant for esophageal reflux with Pritchett


esophagus, hypertension, hyperlipidemia, chronic kidney disease, severe COPD


on oxygen, history of right bundle branch block, anxiety stress, and also


with ongoing smoking.


Past Surgical History:


Significant for a cystoscopy in 2016, tonsillectomy


as a child and upper endoscopy for Pritchett esophagus.





Family History





No pertinent family history


Significant that brother has asthma.  No other significant


family history with parents.





Social History


He is .  He lives with his wife.  He has 4 children. 


He is still smoking as per the wife.  He does not use any alcohol and


activities of daily living is limited due to chronic pain and chronic COPD,


and also some numbness in the extremities secondary to neuropathy.


Smoking Status:  Former Smoker


Marital status:  


Occupational Status:  retired





Immunizations


History of Influenza Vaccine:  Unknown


Influenza Vaccine Date:  Sep 26, 2010


History of Tetanus Vaccine?:  Unknown


History of Pneumococcal:  Unknown


Pneumococcal Date:  Sep 26, 2010


History of Hepatitis B Vaccine:  Unknown





Allergies


Coded Allergies:  


     Iodinated Diagnostic Agents (Verified  Allergy, Unknown, RASH,FACIAL 

SWELLING, 8/10/17)





Current Medications





Reported Home Medications








 Medications  Dose


 Route/Sig


 Max Daily Dose Days Date Category Dose


Instructions


 


 Prednisone 10 Mg


 Tab  10 Mg


 PO Q2D


    8/10/17 Reported 


 


 Combivent


 Respimat


  (Ipratropium-Albuterol)


 1 Aer Aer  1 Puffs


 INH QID PRN


    8/10/17 Reported 


 


 Oxygen  Gas  2.5 Liters


 NA CONTINOUS


    8/10/17 Reported 


 


 Monodox


  (Doxycycline


 Monohydrate) 100


 Mg Cap  100 Mg


 PO MWF


    8/10/17 Reported 


 


 Prednisone 10 Mg


 Tab  20 Mg


 PO Q2D


    8/10/17 Reported 


 


 Prilosec


  (Omeprazole) 20


 Mg Capcr  20 Mg


 PO AS DIRECTED


    12/24/16 Reported  TAKE SUNDAY TUESDAY THURSDAY


 


 Lipitor


  (Atorvastatin


 Calcium) 40 Mg Tab  40 Mg


 PO DAILY


    12/24/16 Reported 


 


 Coreg


  (Carvedilol) 12.5


 Mg Tab  12.5 Mg


 PO BID


    12/24/16 Reported 


 


 Citalopram


 Hydrobromide


  (Citalopram) 20


 Mg Tab  20 Mg


 PO DAILY


    12/24/16 Reported 


 


 Folic Acid 1 Mg


 Tab  1 Mg


 PO DAILY


    12/24/16 Reported 


 


 Amlodipine


 Besylate 5 Mg Tab  10 Mg


 PO DAILY


    12/24/16 Reported 


 


 Lorazepam 1 Mg Tab  1 Mg


 PO BID PRN


    12/24/16 Reported 


 


 Duoneb


  (Ipratropium-Albuterol)


 3 Ml Nebu  3 Ml


 PO QID


    3/4/15 Reported 


 


 Symbicort 160/4.5


 Inhaler


  (Budesonide/Formoterol


 Fumarate)  Aero  2 Puffs


 INH BID


    3/4/15 Reported 











Physical


Physical Exam


Vital Signs:











  Date Time  Temp Pulse Resp B/P (MAP) Pulse Ox O2 Delivery O2 Flow Rate FiO2


 


8/14/17 11:08  74 18  98 Nasal Cannula 2.0 


 


8/14/17 08:00      Nasal Cannula 2.0 


 


8/14/17 07:43 36.5 79 20 181/80 (113) 98 Nasal Cannula 2.0 


 


8/14/17 07:07  67 18  98 Nasal Cannula 2.0 


 


8/14/17 04:20 36.9 84 20 160/82 (108) 95 Nasal Cannula 3.0 


 


8/14/17 04:00      Nasal Cannula 3.0 


 


8/14/17 01:18    150/72 (98)    


 


8/13/17 23:59      Nasal Cannula 3.0 


 


8/13/17 23:40 36.6 83 20 175/76 (109) 98 Nasal Cannula 3.0 


 


8/13/17 20:00      Nasal Cannula 3.0 


 


8/13/17 19:28  84 18  98 Nasal Cannula 3.0 


 


8/13/17 18:55 36.8 92 20 173/65 (101) 96 Room Air  





    155/71 (99)    


 


8/13/17 16:00      Nasal Cannula 3.5 


 


8/13/17 15:29  84 18  98 Nasal Cannula 3.0 


 


8/13/17 15:09 36.5 85 20 166/71 (102) 94 Nasal Cannula 3.0 


 


8/13/17 12:00      Nasal Cannula 3.5 


 


8/13/17 11:25 36.5 67 19 151/71 (97) 97 Nasal Cannula 2.0 


 


8/13/17 11:16  88 18  96 Nasal Cannula 3.0 








General Appearance:  WD/WN, no apparent distress


Eyes:  bilateral eyes EOMI, bilateral eyes PERRL, bilateral eyes normal 

inspection


ENT:  normal ENT inspection, hearing grossly normal, pharynx normal


Neck:  supple, no adenopathy, thyroid normal, no JVD, no carotid bruits, 

trachea midline


Respiratory/Chest:  chest non-tender, normal breath sounds, no respiratory 

distress, no accessory muscle use, + prolonged expiratory phase, sporadic 

expiratory wheezes


Cardiovascular:  regular rate, rhythm, no edema, no JVD, no murmur, 


Abdomen:  normal bowel sounds, non tender, soft, no organomegaly, no pulsatile 

mass


Extremities:  normal inspection, no pedal edema, no calf tendernes, no cyanosis

, no clubbing


Neurologic/Psychiatric:  CNs II-XII nml as tested, no motor/sensory deficits, 

alert, normal mood/affect, oriented x 3


Skin:  normal color, warm/dry, no rash


Lymphatic:  no adenopathy





Diagnostics


Labs





Results Past 24 Hours








Test


  8/14/17


06:17 Range/Units


 


 


White Blood Count 10.94 4.8-10.8  K/uL


 


Red Blood Count 3.88 4.7-6.1  M/uL


 


Hemoglobin 10.8 14.0-18.0  g/dL


 


Hematocrit 34.6 42-52  %


 


Mean Corpuscular Volume 89.2   fL


 


Mean Corpuscular Hemoglobin 27.8 25-34  pg


 


Mean Corpuscular Hemoglobin


Concent 31.2


  32-36  g/dl


 


 


Platelet Count 155 130-400  K/uL


 


Mean Platelet Volume 9.9 7.4-10.4  fL


 


Neutrophils (%) (Auto) 87.8  %


 


Lymphocytes (%) (Auto) 10.1  %


 


Monocytes (%) (Auto) 1.6  %


 


Eosinophils (%) (Auto) 0.0  %


 


Basophils (%) (Auto) 0.0  %


 


Neutrophils # (Auto) 9.60 1.4-6.5  K/uL


 


Lymphocytes # (Auto) 1.11 1.2-3.4  K/uL


 


Monocytes # (Auto) 0.18 0.11-0.59  K/uL


 


Eosinophils # (Auto) 0.00 0-0.5  K/uL


 


Basophils # (Auto) 0.00 0-0.2  K/uL


 


RDW Standard Deviation 48.6 36.4-46.3  fL


 


RDW Coefficient of Variation 14.9 11.5-14.5  %


 


Immature Granulocyte % (Auto) 0.5  %


 


Immature Granulocyte # (Auto) 0.05 0.00-0.02  K/uL


 


Sodium Level 145 136-145  mmol/L


 


Potassium Level 4.3 3.5-5.1  mmol/L


 


Chloride Level 114   mmol/L


 


Carbon Dioxide Level 24 21-32  mmol/L


 


Anion Gap 7.0 3-11  mmol/L


 


Blood Urea Nitrogen 49 7-18  mg/dl


 


Creatinine


  1.80


  0.60-1.40


mg/dl


 


Est Creatinine Clear Calc


Drug Dose 36.0


   ml/min


 


 


Estimated GFR (


American) 42.9


   


 


 


Estimated GFR (Non-


American 37.0


   


 


 


BUN/Creatinine Ratio 27.2 10-20  


 


Random Glucose 135 70-99  mg/dl


 


Calcium Level 7.7 8.5-10.1  mg/dl


 


Phosphorus Level 3.1 2.5-4.9  mg/dl


 


Magnesium Level 2.3 1.8-2.4  mg/dl


 


Total Bilirubin 0.3 0.2-1  mg/dl


 


Direct Bilirubin < 0.1 0-0.2  mg/dl


 


Aspartate Amino Transf


(AST/SGOT) 13


  15-37  U/L


 


 


Alanine Aminotransferase


(ALT/SGPT) 21


  12-78  U/L


 


 


Alkaline Phosphatase 70   U/L


 


Total Protein 5.6 6.4-8.2  gm/dl


 


Albumin 2.9 3.4-5.0  gm/dl











Diagnostic Radiology





CXR 8/11/2017


FINDINGS: The endotracheal tube is been removed. The heart is normal in size.


Emphysema. Old, healed left-sided rib fractures. The nasogastric tube is been


removed. No evidence for pulmonary edema at this time. No pleural effusions. No


pneumothorax. The right basilar linear density favors atelectasis or scarring.


No new focal lung consolidation.





CXR 8/10/2017


FINDINGS: Endotracheal tube positioned 4.5 cm both rl. Prominent pulmonary


vasculature. No evidence for cardiac enlargement. Small parenchymal infiltrate


right base. 





IMPRESSION:  


1. Endotracheal tube 4.5 cm above the rl. 


2. Mild congestive failure.


3. Small parenchymal infiltrate versus atelectasis right base.





IMPRESSION:


Endotracheal tube and nasogastric tubes have been removed. No evidence for


pulmonary edema.. Stable linear scarlike density within the right lung base.





TTE 8/12/2017


* -- Conclusions --


* Aortic valve sclerosis mild, without significant aortic valvular stenosis.


* There is mild concentric left ventricular hypertrophy.


* No regional wall motion abnormalities noted.


* Left ventricular systolic function is normal.


* The LV Ejection Fraction = 50-55%.


* Grade I diastolic dysfunction, (abnormal relaxation pattern).


* The right ventricular systolic function is normal as assessed by tricuspid 

annular plane systolic excursion (TAPSE) (normal >1.5 cm).


* There is no significant valvular heart disease.





EKG





EKG 8/10/2017


*** Poor data quality, interpretation may be adversely affected


Normal sinus rhythm


Biatrial enlargement


Right bundle branch block


Abnormal ECG





Impression


Assessment and Plan


1. Acute on chronic hypoxic respiratory failure


2. Possible COPD exacerbation


3. Possible aspiration


4. Tropinemia


5. Elevated BNP


6. Diastolic dysfunction


7. CKD Stage III





Patient has chronic respiratory insufficiency on LTOT. I do not see any 

official PFT documented in the chart.


Due to the acuity of events, I feel that COPD exacerbation in unlikely. He had 

no change in respiratory symptoms leading up to this event.


This appears to be more of an aspiration, pulmonary embolism  or pneumonitis 

type pictures. Patient did have elevated troponins and BNP. 2D echo did not 

show right heart strain. EKG shows chronic right bundle branch block pattern. 

He has appears to be back at baseline. 


At this time, I would continue with your current management with bronchodilators

, inhaled and systemic corticosteroids and empiric antibiotics.


I offered patient a VQ scan to rule out pulmonary embolism. He refused 

adamantly and wants to go home.


Patient smoking cessation advised. He should follow up with pulmonary as an 

outpatient for full PFT and enrollment in the lung cancer screening program.








I appreciate the consult. Please re consult me if you have any other questions 

or concerns

## 2017-08-14 NOTE — DISCHARGE INSTRUCTIONS
Discharge Instructions


Date of Service


Aug 14, 2017.





Admission


Reason for Admission:  Acute Respiratory Failure, Copd Exacerbation





Discharge


Discharge Diagnosis / Problem:  ACUTE COPD EXACERBATION





Discharge Goals


Goal(s):  Diagnostic testing, Therapeutic intervention





Activity Recommendations


Activity Limitations:  as noted below (NO HEAVY EXERTION UNTIL RE-EVALUATED BY 

PRIMARY CARE PHYSICIAN)


Lifting Limitations:  until after follow-up appointment


Exercise/Sports Limitations:  until after follow-up appointment





.





Instructions / Follow-Up


Instructions / Follow-Up


PLEASE REVIEW YOUR NEW MEDICATION LIST AND FOLLOW INSTRUCTIONS CAREFULLY.


TAKE PROBIOTICS DAILY WHILE ON ANTIBIOTICS AND AT LEAST 1 WEEK AFTER FINISHING 

ANTIBIOTIC COURSE.


ENSURE ADEQUATE DAILY FLUID INTAKE. 


DO NOT TAKE NSAIDS- EXAMPLES: IBUPROFEN, NAPROXEN, ETC.





FOLLOW UP WITH PRIMARY CARE PHYSICIAN DR. WESLEY (ASSOCIATE OF DR. SORENSON) ON 

THURSDAY AUGUST 17, 2017 AT 10:45 AM.


FOLLOW UP WITH [facility] PULMONOLOGIST IN 1 WEEK.





PLEASE FOLLOW SPEECH THERAPIST RECOMMENDATIONS


Dental soft diet


Fully upright for meals  and for 30 minutes after meals.  


Head of the bed to be positioned to at  least 30 degrees at all times, to 

include while asleep.


Small bites/small sips.  Alternate solids and liquids.  Rest breaks as needed.





Current Hospital Diet


Patient's current hospital diet: Regular Diet





Discharge Diet


Recommended Diet:  AHA Diet (Heart Healthy)


Diet Texture:  Dental Soft (bite-sized)





Procedures


Procedures Performed:  


INTUBATION, MECHANICAL VENTILATION





Pending Studies


Studies pending at discharge:  no





Medical Emergencies








.


Who to Call and When:





Medical Emergencies:  If at any time you feel your situation is an emergency, 

please call 911 immediately.





.





Non-Emergent Contact


Non-Emergency issues call your:  Primary Care Provider, Pulmonologist


Call Non-Emergent contact if:  you have a fever, you have any medication 

questions





.


.








"Provider Documentation" section prepared by Matt Funez.








.





VTE Core Measure


Inpt VTE Proph given/why not?:  Unfractionated heparin SQ

## 2017-08-14 NOTE — DISCHARGE SUMMARY
Discharge Summary


Date of Service


Aug 14, 2017.





Discharge Summary


Admission Date:


Aug 10, 2017 at 21:04


Discharge Date:  Aug 14, 2017


Discharge Disposition:  Home


Principal Diagnosis:


Acute respiratory failure, Hypoxic, Hypercapneic secondary to 


possible Aspiration PNA vs. Community Acquired Pneumonia, COPD Exacerbation


Secondary Diagnoses/Problems:


Please refer to hospital course below.


Procedures:


CHEST ONE VIEW PORTABLE





CLINICAL HISTORY: intubated tube position





COMPARISON STUDY:  1/5/2017





FINDINGS: Endotracheal tube positioned 4.5 cm both rl. Prominent pulmonary


vasculature. No evidence for cardiac enlargement. Small parenchymal infiltrate


right base. 





IMPRESSION:  


1. Endotracheal tube 4.5 cm above the rl. 


2. Mild congestive failure.


3. Small parenchymal infiltrate versus atelectasis right base.





ECHO:


* -- Conclusions --


* Aortic valve sclerosis mild, without significant aortic valvular stenosis.


* There is mild concentric left ventricular hypertrophy.


* No regional wall motion abnormalities noted.


* Left ventricular systolic function is normal.


* The LV Ejection Fraction = 50-55%.


* Grade I diastolic dysfunction, (abnormal relaxation pattern).


* The right ventricular systolic function is normal as assessed by tricuspid 

annular plane systolic excursion (TAPSE) (normal >1.5 cm).


* There is no significant valvular heart disease.


Consultations:


INTENSIVIST, PULMONARY


Pending Studies/Follow-Up:


PLEASE REFER TO HOSPITAL COURSE BELOW.





Medication Reconciliation


New Medications:  


Doxycycline Hyclate (Doxycycline Hyclate) 100 Mg Cap


1 CAP PO BID for 5 Days, #10 CAP 0 Refills





Prednisone Tab (Prednisone) 10 Mg Tab


10 MG PO UD, #27 TAB


take 4 tabs po daily x 3 days, then


 take 3 tabs po daily x 3 days, then


 take 2 tabs po daily x 3 days, then


 take usual Prednisone 20mg po every other day


Amoxicillin & Pot Clavulanate (Amoxicillin/Clavulanate P) 1 Tab Tab


875 MG PO BIDM for 5 Days, #5 TAB 0 Refills





 


Continued Medications:  


Amlodipine Besylate (Amlodipine Besylate) 5 Mg Tab


10 MG PO DAILY, #30





Atorvastatin (Lipitor) 40 Mg Tab


40 MG PO DAILY, TAB





Budesonide/Formoterol Fumarate (Symbicort 160/4.5 Inhaler )  Aero


2 PUFFS INH BID





Carvedilol (Coreg) 12.5 Mg Tab


12.5 MG PO BID, TAB





Citalopram (Citalopram Hydrobromide) 20 Mg Tab


20 MG PO DAILY, #30





Folic Acid (Folic Acid) 1 Mg Tab


1 MG PO DAILY





Home O2 Therapy (Oxygen)  Gas


2.5 LITERS NA CONTINOUS, BTL





Ipratropium-Albuterol (Duoneb) 3 Ml Nebu


3 ML PO QID





Ipratropium-Albuterol (Combivent Respimat) 1 Aer Aer


1 PUFFS INH QID PRN for Shortness of Breath, INH





Lorazepam (Lorazepam) 1 Mg Tab


1 MG PO BID PRN for Anxiety, #60





Omeprazole (Prilosec) 20 Mg Capcr


20 MG PO AS DIRECTED, CAP


TAKE SUNDAY TUESDAY THURSDAY


Prednisone (Prednisone) 10 Mg Tab


20 MG PO Q2D for COPD, #45





Prednisone Tab (Prednisone) 10 Mg Tab


10 MG PO Q2D for RESCUE KIT, TAB





 


Discontinued Medications:  


Doxycycline Monohydrate (Monodox) 100 Mg Cap


100 MG PO MWF, CAP











Admission Information


HPI (per Admitting provider):


CHIEF COMPLAINT:  Sudden shortness of breath with unresponsiveness for about


10 minutes.


 


HISTORY OF PRESENT COMPLAINT:  He is a 71-year-old male with significant past


medical history including severe oxygen dependent COPD with ongoing smoking,


history of Pritchett esophagus, hiatal hernia, hypertension, hyperlipidemia,


chronic kidney disease and anxiety stress.  Apparently, has had his dinner


today and following dinner he ate a candy and then was going to his room for


rest.  About 15 minutes later he called his wife and said that he cannot


breath.  His wife checked oxygen which was 81 on oxygen, and also gave him


inhaler, but after that his saturation did not improve, the condition got


worse and he became unresponsive.  EMS was called.  He was out for about 10


minutes and his saturation went down to 30s.  From that point, he was


intubated and he was brought in to the Emergency Room.


 


Further asking question to the wife; no history of fever, chills, or rigors. 


No recent cough or phlegm.  He has had pneumonia about 1 month ago and got


treatment for that and is supposed to have an x-ray as an outpatient


recently.  He denies to have any abdominal pain, nausea or vomiting, any


chest pain or palpitation, any numbness or tingling in the extremities, and


no definite weakness involving any side before the incident.  In the


Emergency Room, he is intubated.  He is hemodynamically stable, with x-ray


showing bibasilar atelectasis.  White count 12,000 and his BNP is elevated at


4654 but chest x-ray is not showing any overt pulmonary edema.  EKG


unremarkable and arterial blood gas showed to be acidotic with pH of 7.20,


pCO2 of 69 and pO2 420.  From that point, he was admitted to ICU for


continuation of care.  He was started with intravenous cefepime and also


Levaquin.


Physical Exam (per Admitting):





GENERAL:  On examination in the Emergency Room, he was intubated.  He was


opening eyes on commands.  He was squeezing my hands and moving the leg on


command.  No acute apparent distress.


VITAL SIGNS:  Temperature 36.7, pulse was 82, blood pressure 106/60,


saturation 100% on 100% FiO2.


HEENT:  Unremarkable.


NECK:  Supple.  No JVD, no bruit.


CHEST:  Decreased breath sounds all over with minimal bibasilar rales.


HEART:  S1, S2 regular.


ABDOMEN:  Soft, benign, slightly distended, nontender, no organomegaly. 


Bowel sounds present.


EXTREMITIES:  Trace edema bilaterally but otherwise unremarkable.


MUSCULOSKELETAL:  Did not show any acute arthritis.


CENTRAL NERVOUS SYSTEM:  Sedated on vent, opening eyes and moving limbs on


commands and also squeezing hand.





Hospital Course





1.  Acute respiratory failure, Hypoxic, Hypercapneic


    possible Aspiration PNA vs. Community Acquired Pneumonia, COPD Exacerbation





-- intubated in the field


    ABG respiratory acidosis





-- IMPRESSION:  


   1. Endotracheal tube 4.5 cm above the rl. 


   2. Mild congestive failure.


   3. Small parenchymal infiltrate versus atelectasis right base.





-- was on Ventilator x 2 days


   weaned off to 2 liters NC





-- on Cefepime, Levaquin Day 4--> transitioned to Augmentin and Levaquin PO 


   tapered  Solumedrol 60 q6h to 40mg q8h to q12h


   given scheduled Bronchodilators and Symbicort





-- speech therapy eval: high aspiration risk due to COPD, Hiatal Hernia\





-- evaluated by Pulm


  VQ scan recommended, but patient declined


  patient aware that if PE is missed, he can worsen and even lead to death


  patient verbalized understanding and agreement 


  wife at bedside





-- d/c plan:


   Augmentin PO x 5 more days 


   Levaquin 250mg po x 3 more doses


   (to complete 10 days total of antibiotic course)


   Prednisone taper


   continue Symbicort, Duonebs





2.  Chronic obstructive pulmonary disease exacerbation, most likely secondary


to pneumonia and or Aspiration.


-- management per #1





3.  Hypertension


-- usually on Amlodipine and Carvedilol


-- noted to be elevated while admitted


   patient was on IV solumedrol, likely contributed


-- placed on PO Labetalol + Hydralazine, usual Amlodipine


-- BP improved


-- continue usual BP medications- Amlodipine and Carvedilol


-- monitor BP as outpatient





4.  Acute Renal Failure on CKD 3


-- crea 1.6-1.8


-- denies changes with urination


-- given IV fluids


-- repeat crea on follow up with PCP





5.  Anxiety 


-- resumed Celexa





6.  Hyperlipidemia.  


-- on Atorvastatin


 





Dispo


d/c home


ff up with PCP in 3-5 days


ff up with [facility] in 1-2 weeks


Total time spent on discharge = 35 MINUTES


This includes examination of the patient, discharge planning, medication 

reconciliation, and communication with other providers.





Discharge Instructions


Discharge Instructions


Date of Service


Aug 14, 2017.





Admission


Reason for Admission:  Acute Respiratory Failure, Copd Exacerbation





Discharge


Discharge Diagnosis / Problem:  ACUTE COPD EXACERBATION





Discharge Goals


Goal(s):  Diagnostic testing, Therapeutic intervention





Activity Recommendations


Activity Limitations:  as noted below (NO HEAVY EXERTION UNTIL RE-EVALUATED BY 

PRIMARY CARE PHYSICIAN)


Lifting Limitations:  until after follow-up appointment


Exercise/Sports Limitations:  until after follow-up appointment





.





Instructions / Follow-Up


Instructions / Follow-Up


PLEASE REVIEW YOUR NEW MEDICATION LIST AND FOLLOW INSTRUCTIONS CAREFULLY.


TAKE PROBIOTICS DAILY WHILE ON ANTIBIOTICS AND AT LEAST 1 WEEK AFTER FINISHING 

ANTIBIOTIC COURSE.


ENSURE ADEQUATE DAILY FLUID INTAKE. 


DO NOT TAKE NSAIDS- EXAMPLES: IBUPROFEN, NAPROXEN, ETC.





FOLLOW UP WITH PRIMARY CARE PHYSICIAN DR. WESLEY (ASSOCIATE OF DR. SORENSON) ON 

THURSDAY AUGUST 17, 2017 AT 10:45 AM.


FOLLOW UP WITH [facility] PULMONOLOGIST IN 1 WEEK.





PLEASE FOLLOW SPEECH THERAPIST RECOMMENDATIONS


Dental soft diet


Fully upright for meals  and for 30 minutes after meals.  


Head of the bed to be positioned to at  least 30 degrees at all times, to 

include while asleep.


Small bites/small sips.  Alternate solids and liquids.  Rest breaks as needed.





Current Hospital Diet


Patient's current hospital diet: Regular Diet





Discharge Diet


Recommended Diet:  AHA Diet (Heart Healthy)


Diet Texture:  Dental Soft (bite-sized)





Procedures


Procedures Performed:  


INTUBATION, MECHANICAL VENTILATION





Pending Studies


Studies pending at discharge:  no





Medical Emergencies








.


Who to Call and When:





Medical Emergencies:  If at any time you feel your situation is an emergency, 

please call 911 immediately.





.





Non-Emergent Contact


Non-Emergency issues call your:  Primary Care Provider, Pulmonologist


Call Non-Emergent contact if:  you have a fever, you have any medication 

questions





.


.








"Provider Documentation" section prepared by Matt Funez.








.





VTE Core Measure


Inpt VTE Proph given/why not?:  Unfractionated heparin SQ

## 2017-08-14 NOTE — PROGRESS NOTE
Medicine Progress Note


Date & Time of Visit:


Aug 14, 2017 at 14:02.


Subjective


patient seen resting in bed, comfortable


in good spirits


states he feels much better


has occasional cough, no chest pain/dyspnea, palpitations


denies other symptoms


states he is ready and would like to be discharged today





declines having VQ scan or anymore testing


discussed at length the risks and benefits


patient aware that if PE is missed, he can worsen and even lead to death


patient verbalized understanding and agreement 


wife at bedside





Objective





Last 8 Hrs








  Date Time  Temp Pulse Resp B/P (MAP) Pulse Ox O2 Delivery O2 Flow Rate FiO2


 


8/14/17 12:00      Nasal Cannula 2.0 


 


8/14/17 11:30 36.6 75 19 147/68 (94) 98 Nasal Cannula 2.0 


 


8/14/17 11:08  74 18  98 Nasal Cannula 2.0 


 


8/14/17 08:00      Nasal Cannula 2.0 


 


8/14/17 07:43 36.5 79 20 181/80 (113) 98 Nasal Cannula 2.0 


 


8/14/17 07:07  67 18  98 Nasal Cannula 2.0 








Physical Exam:





General- oriented x 3, not in distress, speaks in sentences with no effort





Neck- no JVD





Lungs- diminished breath sounds bilaterally, clear breath sounds 





Heart- regular rhythm; no murmur, normal rate





Abdomen- normal bowel sounds, soft, nontender





Extremities- no pretibial edema, no calf tenderness





Neuro- oriented x 3; no gross focal deficits





Skin- warm & dry


Laboratory Results:





Last 24 Hours








Test


  8/14/17


06:17


 


White Blood Count 10.94 K/uL 


 


Red Blood Count 3.88 M/uL 


 


Hemoglobin 10.8 g/dL 


 


Hematocrit 34.6 % 


 


Mean Corpuscular Volume 89.2 fL 


 


Mean Corpuscular Hemoglobin 27.8 pg 


 


Mean Corpuscular Hemoglobin


Concent 31.2 g/dl 


 


 


Platelet Count 155 K/uL 


 


Mean Platelet Volume 9.9 fL 


 


Neutrophils (%) (Auto) 87.8 % 


 


Lymphocytes (%) (Auto) 10.1 % 


 


Monocytes (%) (Auto) 1.6 % 


 


Eosinophils (%) (Auto) 0.0 % 


 


Basophils (%) (Auto) 0.0 % 


 


Neutrophils # (Auto) 9.60 K/uL 


 


Lymphocytes # (Auto) 1.11 K/uL 


 


Monocytes # (Auto) 0.18 K/uL 


 


Eosinophils # (Auto) 0.00 K/uL 


 


Basophils # (Auto) 0.00 K/uL 


 


RDW Standard Deviation 48.6 fL 


 


RDW Coefficient of Variation 14.9 % 


 


Immature Granulocyte % (Auto) 0.5 % 


 


Immature Granulocyte # (Auto) 0.05 K/uL 


 


Sodium Level 145 mmol/L 


 


Potassium Level 4.3 mmol/L 


 


Chloride Level 114 mmol/L 


 


Carbon Dioxide Level 24 mmol/L 


 


Anion Gap 7.0 mmol/L 


 


Blood Urea Nitrogen 49 mg/dl 


 


Creatinine 1.80 mg/dl 


 


Est Creatinine Clear Calc


Drug Dose 36.0 ml/min 


 


 


Estimated GFR (


American) 42.9 


 


 


Estimated GFR (Non-


American 37.0 


 


 


BUN/Creatinine Ratio 27.2 


 


Random Glucose 135 mg/dl 


 


Calcium Level 7.7 mg/dl 


 


Phosphorus Level 3.1 mg/dl 


 


Magnesium Level 2.3 mg/dl 


 


Total Bilirubin 0.3 mg/dl 


 


Direct Bilirubin < 0.1 mg/dl 


 


Aspartate Amino Transf


(AST/SGOT) 13 U/L 


 


 


Alanine Aminotransferase


(ALT/SGPT) 21 U/L 


 


 


Alkaline Phosphatase 70 U/L 


 


Total Protein 5.6 gm/dl 


 


Albumin 2.9 gm/dl 











Assessment & Plan


IMPRESSION AND PLAN:


1.  Acute respiratory failure, Hypoxic, Hypercapneic


    possible Aspiration PNA vs. Community Acquired Pneumonia, COPD Exacerbation





-- intubated in the field


    ABG respiratory acidosis





-- IMPRESSION:  


   1. Endotracheal tube 4.5 cm above the rl. 


   2. Mild congestive failure.


   3. Small parenchymal infiltrate versus atelectasis right base.





-- was on Ventilator x 2 days


   weaned off to 2 liters NC





-- on Cefepime, Levaquin Day 4--> transitioned to Augmentin and Levaquin PO 


   tapered  Solumedrol 60 q6h to 40mg q8h to q12h


   given scheduled Bronchodilators and Symbicort





-- speech therapy eval: high aspiration risk due to COPD, Hiatal Hernia\





-- evaluated by Pulm


  VQ scan recommended, but patient declined


  patient aware that if PE is missed, he can worsen and even lead to death


  patient verbalized understanding and agreement 


  wife at bedside





-- d/c plan:


   Augmentin PO x 5 more days 


   Levaquin 250mg po x 3 more doses


   (to complete 10 days total of antibiotic course)


   Prednisone taper


   continue Symbicort, Duonebs





2.  Chronic obstructive pulmonary disease exacerbation, most likely secondary


to pneumonia and or Aspiration.


-- management per #1





3.  Hypertension


-- usually on Amlodipine and Carvedilol


-- noted to be elevated while admitted


   patient was on IV solumedrol, likely contributed


-- placed on PO Labetalol + Hydralazine, usual Amlodipine


-- BP improved


-- continue usual BP medication


-- monitor BP as outpatient





4.  Acute Renal Failure on CKD 3


-- crea 1.6-1.8


-- denies changes with urination


-- given IV fluids


-- repeat crea as outpatient





5.  Anxiety 


-- resumed Celexa





6.  Hyperlipidemia.  


-- on Atorvastatin


 





Dispo


d/c home


ff up with PCP in 3-5 days


ff up with [facility] in 1-2 weeks


Current Inpatient Medications:





Current Inpatient Medications








 Medications


  (Trade)  Dose


 Ordered  Sig/Gasper


 Route  Start Time


 Stop Time Status Last Admin


Dose Admin


 


 Heparin Sodium


  (Porcine)


  (Heparin Sq 5000


 Unit/0.5ml)  5,000 unit  Q8


 SQ  8/11/17 06:00


 9/10/17 05:59  8/14/17 05:45


5,000 UNIT


 


 Metoprolol


 Tartrate


  (Lopressor Iv)  5 mg  Q6H  PRN


 IV  8/11/17 08:45


 9/10/17 08:44  8/12/17 03:17


5 MG


 


 Budesonide/


 Formoterol


 Fumarate


  (Symbicort 160/


 4.5 Inh)  2 puffs  BID


 INH  8/11/17 21:00


 9/10/17 20:59  8/14/17 08:09


2 PUFFS


 


 Pantoprazole


 Sodium


  (Protonix Tab)  40 mg  QAM


 PO  8/12/17 09:00


 9/11/17 08:59  8/14/17 08:08


40 MG


 


 Amlodipine


 Besylate


  (Norvasc Tab)  10 mg  DAILY


 PO  8/12/17 09:00


 9/11/17 08:59  8/14/17 08:08


10 MG


 


 Albuterol/


 Ipratropium


  (Duoneb)  3 ml  Q4RWA


 INH  8/11/17 16:00


 9/10/17 15:59  8/14/17 11:06


3 ML


 


 Lorazepam


  (Ativan Tab)  1 mg  BID  PRN


 PO  8/11/17 15:45


 9/10/17 15:44  8/13/17 20:32


1 MG


 


 Citalopram


 Hydrobromide


  (celeXA TAB)  20 mg  DAILY


 PO  8/12/17 10:00


 9/11/17 09:59  8/14/17 08:08


20 MG


 


 Lorazepam


  (Ativan Tab)  1 mg  BID  PRN


 PO  8/12/17 09:30


 9/11/17 09:29   


 


 


 Hydralazine HCl


  (Apresoline Tab)  25 mg  BID


 PO  8/12/17 21:00


 9/11/17 20:59  8/14/17 08:08


25 MG


 


 Hydralazine HCl


  (HydrALAZINE INJ)  20 mg  Q4  PRN


 IV.  8/12/17 16:00


 9/11/17 09:29  8/13/17 23:44


20 MG


 


 Labetalol HCl


  (Normodyne Tab)  400 mg  BID


 PO  8/12/17 13:00


 9/11/17 12:59  8/14/17 08:08


400 MG


 


 Methylprednisolone


 Sodium Succinate


 40 mg/Syringe  0.64 ml @ 


 1.5 mls/min  Q12H


 IV  8/14/17 02:00


 9/11/17 13:59  8/14/17 01:14


1.5 MLS/MIN


 


 Amoxicillin/


 Clavulanate


 Potassium


  (Augmentin Tab)  875 mg  BIDM


 PO  8/13/17 19:00


 8/20/17 18:59  8/14/17 08:08


875 MG


 


 Levofloxacin


  (Levaquin Tab)  250 mg  DAILY@11


 PO  8/15/17 11:00


 8/22/17 10:59

## 2017-11-11 ENCOUNTER — HOSPITAL ENCOUNTER (EMERGENCY)
Dept: HOSPITAL 45 - C.EDB | Age: 72
Discharge: HOME | End: 2017-11-11
Payer: COMMERCIAL

## 2017-11-11 VITALS
HEIGHT: 67.99 IN | BODY MASS INDEX: 21.99 KG/M2 | HEIGHT: 67.99 IN | BODY MASS INDEX: 21.99 KG/M2 | WEIGHT: 145.06 LBS | WEIGHT: 145.06 LBS

## 2017-11-11 VITALS
HEART RATE: 82 BPM | OXYGEN SATURATION: 98 % | DIASTOLIC BLOOD PRESSURE: 91 MMHG | SYSTOLIC BLOOD PRESSURE: 178 MMHG | TEMPERATURE: 97.7 F

## 2017-11-11 VITALS — OXYGEN SATURATION: 96 %

## 2017-11-11 DIAGNOSIS — J44.9: ICD-10-CM

## 2017-11-11 DIAGNOSIS — S41.111A: Primary | ICD-10-CM

## 2017-11-11 DIAGNOSIS — K21.9: ICD-10-CM

## 2017-11-11 DIAGNOSIS — I12.9: ICD-10-CM

## 2017-11-11 DIAGNOSIS — Z99.81: ICD-10-CM
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
## 2017-11-11 DIAGNOSIS — N18.3: ICD-10-CM

## 2017-11-11 NOTE — EMERGENCY ROOM VISIT NOTE
ED Visit Note


First contact with patient:  12:31


Chief Complaint: "Fell, cut arm".





History of Present Illness: This patient is a 72-year-old male who presents to 

the Emergency Department via private vehicle accompanied by female for 

evaluation of their right arm lacerations. Patient sustained the laceration 

while getting up in the middle the night 2 days ago, and fell striking the arm 

against the oxygen tank nozzle/adjustment these. They report a moderate amount 

of bleeding initially. They deny any numbness or tingling into the distal 

extremity.  He notes it is painful around the wounds.  He requires 3.4 liters 

of oxygen nasal cannula.  This is baseline.  Tetanus is up-to-date.  He denies 

any bony tenderness just pain at the site.  





Medications: As noted below





Allergies: As noted below





PMH: As noted below





SHx: Patient lives locally.





ROS: All pertinent positive and negative review of systems are appropriately 

documented in the History of Present Illness.





Physical Exam:


VITAL SIGNS - Vital signs and nursing notes were reviewed.  Stable.  Afebrile.


GENERAL -72-year-old male appearing his stated age who is in no acute distress. 

Communicates well with provider and answers questions appropriately.


SKIN - There are 2 lacerations.  One is overlying the dorsal right forearm and 

the other one is at the proximal lateral/dorsal forearm.  The superior one is 

approximately 8 cm x 5 cm with an overlying dried 4 x 4 in place.  The inferior 

one is approximately 8 cm in length.  No drainage or discharge.  No foreign 

bodies appreciated. Upon further examination there are no deep structures 

including vessel, tendon, or bony structures appreciated. There is no active 

bleeding noted.  Small amount of erythema surrounding the lacerations.


MUSCULOSKELETAL -full range of motion of the right arm.  No bony tenderness.  

There is tenderness overlying the regions.


NEUROLOGIC - Spinothalamic tract was found to be intact with ability to 

discriminate sharp versus dull sensation. No sensory defects of the dorsal 

column were appreciated utilizing light touch for evaluation.   








ED Course: 





Patient was seen and evaluated by myself and the attending physician.  Risks 

and benefits of performing primary wound closure versus no repair were 

discussed with the patient who verbalizes understanding.  He declined 

radiographs that I recommended of the right arm.  He is to many days past 

initial injury to close the wounds.  There are a beginning to heal.  There is a 

4 x 4 that is adhered and dried to the wound.  Let gel was applied to the 

superior portion.  This was allowed to sit as well as sterile saline was used 

to soak this.  This was then removed.  He was given oxycodone for pain.  The 

wound was dressed and cleansed with sterile saline and then a bacitracin 

dressing with Telfa being a nonstick bandage given as though he had difficulty 

with the gauze.  This was secured with Curlex and then with an Ace wrap.  The 

inferior wound was also cleansed with sterile saline and dressed with bacitracin

, Telfa and followed by Ace wrap.  He is to follow with the family doctor for 

recheck early in the week.  He'll be given Keflex.  He does have some 

underlying kidney disease.  The results we have are from many months ago.  In 

the event that he has worsened I will dose this conservatively.  He was 

educated upon management, educated upon worrisome symptoms which to return, had 

questions answered prior to discharge, and was discharged home in good 

condition. 





Medications list was reviewed.  Vital signs reviewed.  Hypertension and 

believes secondary to situation.





In evaluation treatment this patient the following differential diagnoses were 

entertained: Fracture, dislocation, delayed wound healing, infection, among 

others.


Problem List


Medical Problems:


(1) Anxiety


Status: Chronic  





(2) CKD (chronic kidney disease) stage 3, GFR 30-59 ml/min


Status: Chronic  





(3) Claudication


Status: Resolved  





(4) COPD (chronic obstructive pulmonary disease)


Status: Chronic  





(5) Dyslipidemia


Status: Chronic  





(6) Elevated troponin


Status: Chronic  





(7) History of smoking greater than 50 pack years


Status: Chronic  





(8) HTN (hypertension)


Status: Chronic  





(9) Hypoxia


Status: Chronic  





(10) RBBB (right bundle branch block)


Status: Chronic  





(11) Reflux esophagitis


Status: Chronic  





(12) Shortness of breath


Status: Chronic  





Surgical Problems:


(1) History of endarterectomy


Permanent Comment: L sided endarterectomy in 2010


 R sided endarterectomy in 2011


Status: Resolved  





(2) History of tonsillectomy and adenoidectomy


Status: Resolved  











Current/Historical Medications


Scheduled


Amlodipine Besylate (Amlodipine Besylate), 10 MG PO DAILY


Atorvastatin (Lipitor), 40 MG PO DAILY


Budesonide/Formoterol Fumarate (Symbicort 160/4.5 Inhaler ), 2 PUFFS INH BID


Carvedilol (Coreg), 12.5 MG PO BID


Cephalexin Monohydrate (Keflex), 250 MG PO BID


Citalopram (Citalopram Hydrobromide), 20 MG PO DAILY


Folic Acid (Folic Acid), 1 MG PO DAILY


Home O2 Therapy (Oxygen), 2.5 LITERS NA CONTINOUS


Ipratropium-Albuterol (Duoneb), 3 ML PO QID


Omeprazole (Prilosec), 20 MG PO AS DIRECTED


Prednisone (Prednisone), 20 MG PO Q2D


Prednisone Tab (Prednisone), 10 MG PO Q2D





Scheduled PRN


Albuterol Sulfate (Proventil Hfa), 1 PUFF INH QID PRN for SOB/Wheezing


Lorazepam (Lorazepam), 1 MG PO BID PRN for Anxiety





Allergies


Coded Allergies:  


     Iodinated Diagnostic Agents (Verified  Allergy, Unknown, RASH,FACIAL 

SWELLING, 11/11/17)





Vital Signs











  Date Time  Temp Pulse Resp B/P (MAP) Pulse Ox O2 Delivery O2 Flow Rate FiO2


 


11/11/17 14:59 36.5 82 18 178/91 98   


 


11/11/17 14:25  82 18 178/91 98 Nasal Cannula 3.5 


 


11/11/17 13:00     96 Nasal Cannula 3.5 


 


11/11/17 12:22 36.5 87 18 142/77 92 Room Air  











Medications Administered











 Medications


  (Trade)  Dose


 Ordered  Sig/Gasper


 Route  Start Time


 Stop Time Status Last Admin


Dose Admin


 


 Tetracaine/


 Epinephrine/


 Lidocaine


  (L.e.t. Gel 4%/


 1:100/0.5%)  1 ea  NOW  STAT


 EXT  11/11/17 12:36


 11/11/17 12:38 DC 11/11/17 12:54


1 EA


 


 Tetracaine/


 Epinephrine/


 Lidocaine


  (L.e.t. Gel 4%/


 1:100/0.5%)  1 ea  NOW  STAT


 EXT  11/11/17 12:36


 11/11/17 12:38 DC 11/11/17 12:54


1 EA


 


 Oxycodone HCl


  (Roxicodone


 Immediate Rel Tab)  5 mg  NOW  STAT


 PO  11/11/17 13:26


 11/11/17 13:27 DC 11/11/17 13:33


5 MG











Departure Information


Impression





 Primary Impression:  


 Fall


 Additional Impression:  


 Contusion of multiple sites





Dispostion


Home / Self-Care





Condition


GOOD





Prescriptions





Cephalexin Monohydrate (Keflex) 250 Mg Cap


250 MG PO BID for 5 Days, #10 CAP


   Prov: Bamat, Elijah W., PA-C         11/11/17





Referrals


Raul Knutson D.O. (PCP)





Patient Instructions


My [facility]





Additional Instructions





Discharge Instructions:





Keflex 250mg every 12 hours for 5 days to help prevent infection





Proper wound care is essential for adequate wound healing and infection 

prevention. You can shower and clean the wound with soap and water. Do not 

scour over the wound, pat dry with a towel. Do not submerse the wound (i.e. 

bathe or dish wash) until the wound is healed. You can use an antibiotic 

ointment with a dressing over the wound for the next 3-4 days. After this time 

you may leave the wound dry and open to the air. If crust develops over the 

wound you can use a Q-tip to apply a 1:1 peroxide:water solution to clean the 

wound.





Look for signs of infection of the wound including: increased pain, swelling, 

foul discharge, streaking, or increased temperature. If any of these are 

noticed you should return to the Emergency Department for further assessment 

and treatment.





As with any laceration you may have received nerve damage to the surrounding 

tissues. This damage may or may not be permanent.





Please see your family doctor on Monday for recheck of the wound or return here 

with worsening.  Return with any fevers, chills or drainage.











Return to the emergency department if your symptoms worsen despite treatment 

course outlined above.





Problem Qualifiers

## 2017-11-11 NOTE — EMERGENCY ROOM VISIT NOTE
ED Visit Note


First contact with patient:  12:31


I have personally evaluated and examined this patient.  I agree with assessment 

and plan of Elijah Brothers PA-C.  72 yr old male with right arm cellulitis s/p 

wound.  Non-septic and not systemic symptoms.  Mild wheezing on exam and wife 

notes mildly more dyspneic than usual.  He has no respiratory complaints 

currently.  Suggested either PCP follow up or starting sterid pack per 

instructions.

## 2018-01-05 ENCOUNTER — HOSPITAL ENCOUNTER (EMERGENCY)
Dept: HOSPITAL 45 - C.EDB | Age: 73
Discharge: HOME | End: 2018-01-05
Payer: COMMERCIAL

## 2018-01-05 VITALS — OXYGEN SATURATION: 100 % | DIASTOLIC BLOOD PRESSURE: 97 MMHG | SYSTOLIC BLOOD PRESSURE: 184 MMHG | HEART RATE: 78 BPM

## 2018-01-05 VITALS
HEIGHT: 67.99 IN | BODY MASS INDEX: 21.62 KG/M2 | WEIGHT: 142.64 LBS | HEIGHT: 67.99 IN | BODY MASS INDEX: 21.62 KG/M2 | WEIGHT: 142.64 LBS

## 2018-01-05 VITALS — OXYGEN SATURATION: 100 %

## 2018-01-05 VITALS — TEMPERATURE: 98.78 F

## 2018-01-05 DIAGNOSIS — I12.9: ICD-10-CM

## 2018-01-05 DIAGNOSIS — R06.02: ICD-10-CM

## 2018-01-05 DIAGNOSIS — I45.10: ICD-10-CM

## 2018-01-05 DIAGNOSIS — J44.9: Primary | ICD-10-CM

## 2018-01-05 DIAGNOSIS — N18.3: ICD-10-CM

## 2018-01-05 DIAGNOSIS — F41.9: ICD-10-CM

## 2018-01-05 DIAGNOSIS — D64.9: ICD-10-CM

## 2018-01-05 DIAGNOSIS — Z99.81: ICD-10-CM

## 2018-01-05 DIAGNOSIS — K21.0: ICD-10-CM

## 2018-01-05 DIAGNOSIS — Z87.891: ICD-10-CM

## 2018-01-05 DIAGNOSIS — Z85.51: ICD-10-CM

## 2018-01-05 LAB
ALBUMIN SERPL-MCNC: 3.3 GM/DL (ref 3.4–5)
ALP SERPL-CCNC: 88 U/L (ref 45–117)
ALT SERPL-CCNC: 14 U/L (ref 12–78)
AST SERPL-CCNC: 11 U/L (ref 15–37)
BASOPHILS # BLD: 0.07 K/UL (ref 0–0.2)
BASOPHILS NFR BLD: 0.7 %
BUN SERPL-MCNC: 18 MG/DL (ref 7–18)
CALCIUM SERPL-MCNC: 8.7 MG/DL (ref 8.5–10.1)
CO2 SERPL-SCNC: 30 MMOL/L (ref 21–32)
CREAT SERPL-MCNC: 1.4 MG/DL (ref 0.6–1.4)
EOS ABS #: 0.28 K/UL (ref 0–0.5)
EOSINOPHIL NFR BLD AUTO: 212 K/UL (ref 130–400)
GLUCOSE SERPL-MCNC: 97 MG/DL (ref 70–99)
HCT VFR BLD CALC: 38.6 % (ref 42–52)
HGB BLD-MCNC: 12.4 G/DL (ref 14–18)
IG#: 0.02 K/UL (ref 0–0.02)
IMM GRANULOCYTES NFR BLD AUTO: 22.9 %
INR PPP: 1 (ref 0.9–1.1)
LYMPHOCYTES # BLD: 2.26 K/UL (ref 1.2–3.4)
MCH RBC QN AUTO: 28.9 PG (ref 25–34)
MCHC RBC AUTO-ENTMCNC: 32.1 G/DL (ref 32–36)
MCV RBC AUTO: 90 FL (ref 80–100)
MONO ABS #: 0.96 K/UL (ref 0.11–0.59)
MONOCYTES NFR BLD: 9.7 %
NEUT ABS #: 6.29 K/UL (ref 1.4–6.5)
NEUTROPHILS # BLD AUTO: 2.8 %
NEUTROPHILS NFR BLD AUTO: 63.7 %
PMV BLD AUTO: 9.6 FL (ref 7.4–10.4)
POTASSIUM SERPL-SCNC: 4 MMOL/L (ref 3.5–5.1)
PROT SERPL-MCNC: 6.6 GM/DL (ref 6.4–8.2)
PTT PATIENT: 28.3 SECONDS (ref 21–31)
RED CELL DISTRIBUTION WIDTH CV: 13.7 % (ref 11.5–14.5)
RED CELL DISTRIBUTION WIDTH SD: 45.2 FL (ref 36.4–46.3)
SODIUM SERPL-SCNC: 139 MMOL/L (ref 136–145)
WBC # BLD AUTO: 9.88 K/UL (ref 4.8–10.8)

## 2018-01-05 NOTE — DIAGNOSTIC IMAGING REPORT
CHEST ONE VIEW PORTABLE



CLINICAL HISTORY: EVALUATE RESPIRATORY DISTRESS.DYSPNEA dyspnea



COMPARISON STUDY:  8/12/2017



FINDINGS: Moderate emphysematous change. No focal infiltrate. Several old left

rib fractures. 



IMPRESSION:  Chronic and emphysematous change. No acute process. 











The above report was generated using voice recognition software.  It may contain

grammatical, syntax or spelling errors.









Electronically signed by:  Michael Martinez M.D.

1/5/2018 1:30 PM



Dictated Date/Time:  1/5/2018 1:30 PM

## 2018-01-05 NOTE — EMERGENCY ROOM VISIT NOTE
History


Report prepared by Luis Miguel:  Chirag Gillis


Under the Supervision of:  Dr. Jamin Kuhn M.D.


First contact with patient:  12:40


Chief Complaint:  SHORTNESS OF BREATH


Stated Complaint:  Shortness of Breath


Nursing Triage Summary:  


PT oxygen dependent, HX of COPD. PT presents with SOB and difficulty breathing 

X 


1 days. PT denies CP, has no fever or chills, denies any diaphoresis. PT has no 


swelling to lower legs. lungs diminished throughout.  on 4 liters of O2 

at 


this time.





History of Present Illness


The patient is a 72 year old white male with a past medical history of anxiety, 

hypertension, hyperlipidemia, and COPD who presents to the ED with a cc of 

shortness of breath beginning last night. Positive intermittent nausea and 

sinus congestion. Negative fevers, cough, chest pain, abdominal pain, vomiting, 

back pain, weakness, numbness, leg pain/swelling, or abnormal urinary symptoms. 

He states that nothing exacerbated his pain with this episode. However, he is 

not able to lie down flat which is not new. He uses inhalers and nebulizers 

regularly. He has not changed any medications recently and has been taking them 

normally. He denies any history of CHF or blood clots. He wears 4L of oxygen at 

all times with a normal oxygenation level of 99%. He denies any recent travel 

or sick contacts. The patient's wife notes that his weakness in his legs has 

been worsening causing him to fall intermittently. He has also been more short 

of breath with exertion than usual.





   Source of History:  patient


   Onset:  last night


   Position:  other (Respiratory System)


   Symptom Intensity:  moderate


   Quality:  other (Shortness of breath)


   Timing:  constant


   Modifying Factors (Worsening):  rest (Lying down), exertion


   Associated Symptoms:  + nausea, No fevers, No cough, No chest pain, No 

vomiting, No abdominal pain, No back pain, No urinary symptoms, No weakness, No 

numbness


Note:


The patient is experiencing some sinus congestion.





Review of Systems


See HPI for pertinent positives and negatives.  A total of ten systems were 

reviewed and were otherwise negative.





Past Medical & Surgical


Medical Problems:


(1) Anxiety


(2) CKD (chronic kidney disease) stage 3, GFR 30-59 ml/min


(3) Claudication


(4) COPD (chronic obstructive pulmonary disease)


(5) COPD exacerbation


(6) Dyslipidemia


(7) Elevated troponin


(8) High-grade bladder cancer


(9) History of smoking greater than 50 pack years


(10) HTN (hypertension)


(11) Hypoxia


(12) RBBB (right bundle branch block)


(13) Reflux esophagitis


(14) Shortness of breath


Surgical Problems:


(1) History of endarterectomy


(2) History of tonsillectomy and adenoidectomy








Family History





No pertinent family history





Social History


Smoking Status:  Former Smoker


Drug Use:  none


Marital Status:  


Housing Status:  lives with family


Occupation Status:  retired





Current/Historical Medications


Scheduled


Amlodipine Besylate (Amlodipine Besylate), 10 MG PO DAILY


Atorvastatin (Lipitor), 40 MG PO DAILY


Budesonide/Formoterol Fumarate (Symbicort 160/4.5 Inhaler ), 2 PUFFS INH BID


Carvedilol (Coreg), 12.5 MG PO BID


Citalopram (Citalopram Hydrobromide), 20 MG PO DAILY


Folic Acid (Folic Acid), 1 MG PO DAILY


Home O2 Therapy (Oxygen), 2.5 LITERS NA CONTINOUS


Ipratropium-Albuterol (Duoneb), 3 ML PO QID


Omeprazole (Prilosec), 20 MG PO AS DIRECTED


Prednisone (Prednisone), 20 MG PO Q2D


Prednisone Tab (Prednisone), 10 MG PO Q2D





Scheduled PRN


Albuterol Sulfate (Proventil Hfa), 1 PUFF INH QID PRN for SOB/Wheezing


Lorazepam (Lorazepam), 1 MG PO BID PRN for Anxiety





Allergies


Coded Allergies:  


     Iodinated Diagnostic Agents (Verified  Allergy, Unknown, RASH,FACIAL 

SWELLING, 1/5/18)





Physical Exam


Vital Signs











  Date Time  Temp Pulse Resp B/P (MAP) Pulse Ox O2 Delivery O2 Flow Rate FiO2


 


1/5/18 14:53  78 20 184/97 100 Nasal Cannula 4.0 


 


1/5/18 13:18  74 18 188/102 100 Nebulizer 8.0 


 


1/5/18 12:55     100 Nasal Cannula 4.0 


 


1/5/18 12:30 37.1 78 20 189/88 100 Nasal Cannula 4.0 


 


1/5/18 12:29     100 Nasal Cannula 4.0 


 


1/5/18 12:23  82      


 


1/5/18 12:15     100 Nasal Cannula 4.0 











Physical Exam


GENERAL: Awake, alert, well-appearing, NAD, nasal cannula in place


HENT: Normocephalic, atraumatic. Bilateral nares are clear without drainage. 

Left nares is mildly red with boggy turbinates. 


EYES: Normal conjunctiva. Sclera non-icteric.


NECK: Supple. No nuchal rigidity. FROM.


RESPIRATORY: CTAB, no rhonchi, wheezing, crackles


CARDIAC: RRR, no MRG


ABDOMEN: Soft, NTND, BS+


MSK: No chest wall TTP, no LE edema


NEURO: GCS 15, CN 2-12 intact, moves all 4s on command


SKIN: No rash or jaundice noted.





Medical Decision & Procedures


ER Provider


Diagnostic Interpretation:


Radiology results as stated below per my review and radiologist interpretation: 





CHEST ONE VIEW PORTABLE





CLINICAL HISTORY: EVALUATE RESPIRATORY DISTRESS.DYSPNEA dyspnea





COMPARISON STUDY:  8/12/2017





FINDINGS: Moderate emphysematous change. No focal infiltrate. Several old left


rib fractures. 





IMPRESSION:  Chronic and emphysematous change. No acute process. 





The above report was generated using voice recognition software.  It may contain


grammatical, syntax or spelling errors.





Electronically signed by:  Michael Martinez M.D.


1/5/2018 1:30 PM





Dictated Date/Time:  1/5/2018 1:30 PM





Laboratory Results


1/5/18 13:13








Red Blood Count 4.29, Mean Corpuscular Volume 90.0, Mean Corpuscular Hemoglobin 

28.9, Mean Corpuscular Hemoglobin Concent 32.1, Mean Platelet Volume 9.6, 

Neutrophils (%) (Auto) 63.7, Lymphocytes (%) (Auto) 22.9, Monocytes (%) (Auto) 

9.7, Eosinophils (%) (Auto) 2.8, Basophils (%) (Auto) 0.7, Neutrophils # (Auto) 

6.29, Lymphocytes # (Auto) 2.26, Monocytes # (Auto) 0.96, Eosinophils # (Auto) 

0.28, Basophils # (Auto) 0.07





1/5/18 13:13

















Test


  1/5/18


13:13 1/5/18


13:35


 


White Blood Count


  9.88 K/uL


(4.8-10.8) 


 


 


Red Blood Count


  4.29 M/uL


(4.7-6.1) 


 


 


Hemoglobin


  12.4 g/dL


(14.0-18.0) 


 


 


Hematocrit 38.6 % (42-52)  


 


Mean Corpuscular Volume


  90.0 fL


() 


 


 


Mean Corpuscular Hemoglobin


  28.9 pg


(25-34) 


 


 


Mean Corpuscular Hemoglobin


Concent 32.1 g/dl


(32-36) 


 


 


Platelet Count


  212 K/uL


(130-400) 


 


 


Mean Platelet Volume


  9.6 fL


(7.4-10.4) 


 


 


Neutrophils (%) (Auto) 63.7 %  


 


Lymphocytes (%) (Auto) 22.9 %  


 


Monocytes (%) (Auto) 9.7 %  


 


Eosinophils (%) (Auto) 2.8 %  


 


Basophils (%) (Auto) 0.7 %  


 


Neutrophils # (Auto)


  6.29 K/uL


(1.4-6.5) 


 


 


Lymphocytes # (Auto)


  2.26 K/uL


(1.2-3.4) 


 


 


Monocytes # (Auto)


  0.96 K/uL


(0.11-0.59) 


 


 


Eosinophils # (Auto)


  0.28 K/uL


(0-0.5) 


 


 


Basophils # (Auto)


  0.07 K/uL


(0-0.2) 


 


 


RDW Standard Deviation


  45.2 fL


(36.4-46.3) 


 


 


RDW Coefficient of Variation


  13.7 %


(11.5-14.5) 


 


 


Immature Granulocyte % (Auto) 0.2 %  


 


Immature Granulocyte # (Auto)


  0.02 K/uL


(0.00-0.02) 


 


 


Prothrombin Time


  10.6 SECONDS


(9.0-12.0) 


 


 


Prothromb Time International


Ratio 1.0 (0.9-1.1) 


  


 


 


Activated Partial


Thromboplast Time 28.3 SECONDS


(21.0-31.0) 


 


 


Partial Thromboplastin Ratio 1.1  


 


Anion Gap


  5.0 mmol/L


(3-11) 


 


 


Est Creatinine Clear Calc


Drug Dose 43.6 ml/min 


  


 


 


Estimated GFR (


American) 57.8 


  


 


 


Estimated GFR (Non-


American 49.8 


  


 


 


BUN/Creatinine Ratio 12.7 (10-20)  


 


Calcium Level


  8.7 mg/dl


(8.5-10.1) 


 


 


Total Bilirubin


  0.4 mg/dl


(0.2-1) 


 


 


Aspartate Amino Transf


(AST/SGOT) 11 U/L (15-37) 


  


 


 


Alanine Aminotransferase


(ALT/SGPT) 14 U/L (12-78) 


  


 


 


Alkaline Phosphatase


  88 U/L


() 


 


 


Troponin I


  0.030 ng/ml


(0-0.045) 


 


 


Pro-B-Type Natriuretic Peptide


  3968 pg/ml


(0-900) 


 


 


Total Protein


  6.6 gm/dl


(6.4-8.2) 


 


 


Albumin


  3.3 gm/dl


(3.4-5.0) 


 


 


Globulin


  3.3 gm/dl


(2.5-4.0) 


 


 


Albumin/Globulin Ratio 1.0 (0.9-2)  


 


Urine Color  YELLOW 


 


Urine Appearance  CLEAR (CLEAR) 


 


Urine pH  5.0 (4.5-7.5) 


 


Urine Specific Gravity


  


  1.020


(1.000-1.030)


 


Urine Protein  TRACE (NEG) 


 


Urine Glucose (UA)  NEG (NEG) 


 


Urine Ketones  NEG (NEG) 


 


Urine Occult Blood  NEG (NEG) 


 


Urine Nitrite  NEG (NEG) 


 


Urine Bilirubin  NEG (NEG) 


 


Urine Urobilinogen  NEG (NEG) 


 


Urine Leukocyte Esterase  NEG (NEG) 


 


Urine WBC (Auto)  1-5 /hpf (0-5) 


 


Urine RBC (Auto)  0-4 /hpf (0-4) 


 


Urine Hyaline Casts (Auto)  1-5 /lpf (0-5) 


 


Urine Epithelial Cells (Auto)


  


  5-10 /lpf


(0-5)


 


Urine Bacteria (Auto)  NEG (NEG) 





Laboratory results reviewed by me





Medications Administered











 Medications


  (Trade)  Dose


 Ordered  Sig/Gasper


 Route  Start Time


 Stop Time Status Last Admin


Dose Admin


 


 Albuterol/


 Ipratropium


  (Duoneb)  3 ml  ONE  STAT


 INH  1/5/18 13:08


 1/5/18 13:09 DC 1/5/18 13:15


3 ML











ECG


Indication:  SOB/dyspnea


Rate (beats per minute):  69


Rhythm:  normal sinus


Findings:  other (Wide QRS with RBBB pattern, normal axis, no other STS changes 

or TWI)





ED Course


1240: The patient was evaluated in room B12A. A complete history and physical 

exam was performed.





1422: The patient had a successful ambulatory trial. 





1500: I reevaluated the patient. Discussed results and discharge instructions: 

He verbalized understanding and agreement. The patient is ready for discharge.





Medical Decision


The patient is a 72 year old white male with a past medical history of anxiety, 

hypertension, hyperlipidemia, and COPD who presents to the ED with a cc of 

shortness of breath beginning last night. Positive intermittent nausea and 

sinus congestion. Negative fevers, cough, chest pain, abdominal pain, vomiting, 

back pain, weakness, numbness, leg pain/swelling, or abnormal urinary symptoms. 





Differential diagnosis:


Etiologies such as infections, reactive airway disease, pneumonia, pneumothorax

, COPD, CHF, cardiac ischemia, pulmonary embolism, musculoskeletal, 

gastrointestinal, as well as others were entertained.





Patient was seen and evaluated the bedside.  Patient is a prior history of COPD 

and has been using both nebulizers and inhalers.  Patient does complain of some 

shortness of breath.  Upon further discussion the patient does state that he 

feels as though he is very congested.  Patient did have blood work completed, 

EKG, troponin, BNP, chest x-ray.  Patient's BNP at 3000.  Patient's chest x-ray 

does not show any evidence of volume overload just emphysematous change without 

consolidation, effusion, or pulmonary edema.  Patient also does not appear or 

sound volume overloaded.  Patient's EKG is unchanged from priors.  Patient's 

troponin was not elevated.  Patient does not have an elevated were very low 

white blood cell count.  Patient does have some stable anemia.  Patient was 

given one neb treatment.  Nursing did have the patient walk around the unit and 

the patient was not tachypneic, hypoxic, nor tachycardic.  Upon reassessment of 

the patient the patient was feeling well.  I did discuss that given his fairly 

well-appearing lab work with a negative chest x-ray and unchanged EKG along 

with a very good ambulatory trial that the patient likely could be seen and 

follow up as an outpatient.  Patient family were amenable to this plan of care.

  Patient was advised he may try some saline sprays as well as something like 

petroleum jelly or Vaseline to the inner nasal passages to help with any 

congestion.  He was also told he may try hot showers, a humidifier, or a 

humidifier unit that could be attached to his at-home oxygen.  Patient family 

were agreeable again to this plan of care. Patient was given strict follow-up, 

discharge, and return precautions.  All questions were answered.  Patient was 

deemed suitable for outpatient follow-up at this time.  Patient agreed with the 

plan of care and was safely discharged home.





Medication Reconcilliation


Current Medication List:  was personally reviewed by me





Blood Pressure Screening


Patient's blood pressure:  Elevated blood pressure


Blood pressure disposition:  Elevated BP felt to be situational





Impression





 Primary Impression:  


 Chronic obstructive pulmonary disease


 Additional Impressions:  


 SOB (shortness of breath)


 Anemia





Scribe Attestation


The scribe's documentation has been prepared under my direction and personally 

reviewed by me in its entirety. I confirm that the note above accurately 

reflects all work, treatment, procedures, and medical decision making performed 

by me.





Departure Information


Dispostion


Home / Self-Care





Referrals


Raul Knutson D.O. (PCP)





Forms


HOME CARE DOCUMENTATION FORM,                                                 

               IMPORTANT VISIT INFORMATION





Patient Instructions


COPD - [facility], COPD Dc, ED Dyspnea Shortness of Breath, My [facility]





Additional Instructions





Please return to the emergency department if you have worsening or recurrent 

symptoms not amenable to at-home treatment.  Please call for a follow-up 

appointment with her primary care physician.  Please take your medications as 

prescribed.  If you have other concerns and/or complaints please feel free to 

also call your primary care physician's office or return the ED for further 

evaluation, management, and treatment.





Please call today to arrange a follow-up with her primary care physician.





Take your medications as prescribed. 





You have been examined and treated today on an emergency basis only. This is 

not a substitute for, or an effort to provide, complete comprehensive medical 

care. It is impossible to recognize and treat all injuries or illnesses in a 

single emergency department visit. It is therefore important that you follow up 

closely with [facility], your PCP, and/or your specialist(s). 

Call as soon as possible for an appointment.





Thank you for your time and consideration. I look forward to speaking with you 

again soon. Please don't hesitate to call us if you have any questions.





Problem Qualifiers








 Primary Impression:  


 Chronic obstructive pulmonary disease


 COPD type:  unspecified COPD  Qualified Codes:  J44.9 - Chronic obstructive 

pulmonary disease, unspecified


 Additional Impressions:  


 Anemia


 Anemia type:  unspecified type  Qualified Codes:  D64.9 - Anemia, unspecified

## 2018-03-10 ENCOUNTER — HOSPITAL ENCOUNTER (EMERGENCY)
Dept: HOSPITAL 45 - C.EDB | Age: 73
Discharge: HOME | End: 2018-03-10
Payer: COMMERCIAL

## 2018-03-10 VITALS — DIASTOLIC BLOOD PRESSURE: 87 MMHG | OXYGEN SATURATION: 99 % | SYSTOLIC BLOOD PRESSURE: 177 MMHG | HEART RATE: 87 BPM

## 2018-03-10 VITALS
HEIGHT: 67.99 IN | WEIGHT: 141.1 LBS | HEIGHT: 67.99 IN | BODY MASS INDEX: 21.38 KG/M2 | BODY MASS INDEX: 21.38 KG/M2 | WEIGHT: 141.1 LBS

## 2018-03-10 VITALS — TEMPERATURE: 97.7 F

## 2018-03-10 DIAGNOSIS — N18.3: ICD-10-CM

## 2018-03-10 DIAGNOSIS — Z79.52: ICD-10-CM

## 2018-03-10 DIAGNOSIS — J44.9: ICD-10-CM

## 2018-03-10 DIAGNOSIS — I12.9: ICD-10-CM

## 2018-03-10 DIAGNOSIS — Z99.81: ICD-10-CM

## 2018-03-10 DIAGNOSIS — Z91.041: ICD-10-CM

## 2018-03-10 DIAGNOSIS — R29.6: Primary | ICD-10-CM

## 2018-03-10 DIAGNOSIS — Z87.891: ICD-10-CM

## 2018-03-10 LAB
ALBUMIN SERPL-MCNC: 3.5 GM/DL (ref 3.4–5)
ALP SERPL-CCNC: 92 U/L (ref 45–117)
ALT SERPL-CCNC: 15 U/L (ref 12–78)
AST SERPL-CCNC: 10 U/L (ref 15–37)
BASOPHILS # BLD: 0.05 K/UL (ref 0–0.2)
BASOPHILS NFR BLD: 0.5 %
BUN SERPL-MCNC: 19 MG/DL (ref 7–18)
CALCIUM SERPL-MCNC: 8.8 MG/DL (ref 8.5–10.1)
CO2 SERPL-SCNC: 28 MMOL/L (ref 21–32)
CREAT SERPL-MCNC: 1.59 MG/DL (ref 0.6–1.4)
EOS ABS #: 0.35 K/UL (ref 0–0.5)
EOSINOPHIL NFR BLD AUTO: 205 K/UL (ref 130–400)
GLUCOSE SERPL-MCNC: 93 MG/DL (ref 70–99)
HCT VFR BLD CALC: 38.6 % (ref 42–52)
HGB BLD-MCNC: 12.6 G/DL (ref 14–18)
IG#: 0.03 K/UL (ref 0–0.02)
IMM GRANULOCYTES NFR BLD AUTO: 27.6 %
INFLUENZA B ANTIGEN: (no result)
INR PPP: 1 (ref 0.9–1.1)
LIPASE: 190 U/L (ref 73–393)
LYMPHOCYTES # BLD: 2.64 K/UL (ref 1.2–3.4)
MCH RBC QN AUTO: 28.9 PG (ref 25–34)
MCHC RBC AUTO-ENTMCNC: 32.6 G/DL (ref 32–36)
MCV RBC AUTO: 88.5 FL (ref 80–100)
MONO ABS #: 1.12 K/UL (ref 0.11–0.59)
MONOCYTES NFR BLD: 11.7 %
NEUT ABS #: 5.37 K/UL (ref 1.4–6.5)
NEUTROPHILS # BLD AUTO: 3.7 %
NEUTROPHILS NFR BLD AUTO: 56.2 %
PMV BLD AUTO: 9.2 FL (ref 7.4–10.4)
POTASSIUM SERPL-SCNC: 3.9 MMOL/L (ref 3.5–5.1)
PROT SERPL-MCNC: 6.7 GM/DL (ref 6.4–8.2)
PTT PATIENT: 27.9 SECONDS (ref 21–31)
RED CELL DISTRIBUTION WIDTH CV: 13.6 % (ref 11.5–14.5)
RED CELL DISTRIBUTION WIDTH SD: 44.1 FL (ref 36.4–46.3)
SODIUM SERPL-SCNC: 140 MMOL/L (ref 136–145)
WBC # BLD AUTO: 9.56 K/UL (ref 4.8–10.8)

## 2018-03-10 NOTE — EMERGENCY ROOM VISIT NOTE
History


Report prepared by Luis Miguel:  Lashawn Cain


Under the Supervision of:  Dr. Tom Whitney M.D.


First contact with patient:  11:34


Chief Complaint:  SHORTNESS OF BREATH


Stated Complaint:  BREATHING,BALANCE MEMORY


Nursing Triage Summary:  


pt to the ED with c/o trouble voiding and memory issues per family that started 


1 wk ago and went from being able to walk with a walk and now has troubles





c/o trouble breathing 





called pmd and was told to come to the ED





pt has had 4 falls and family is concerned for a stroke





History of Present Illness


The patient is a 72 year old male who presents to the Emergency Room with 

complaints of recurrent falls and memory issues beginning one week pta. He is 

accompanied by his wife and daughter who report that he has fallen 4 times this 

past week and his speech has been affected. They also note his memory has been 

getting progressively worse over the last few months. He denies any pain 

including chest pain or headache, fevers, or melena. He states he has vomiting 

and diarrhea.





   Source of History:  patient, family (daughter), spouse/significant other (

wife)


   Onset:  one week pta


   Position:  other (global)


   Quality:  other (SOB)


   Timing:  worsening


   Associated Symptoms:  + vomiting, + diarrhea, No fevers, No melena


Note:


Negative pain.





Review of Systems


See HPI for pertinent positives and negatives.  A total of ten systems were 

reviewed and were otherwise negative.





Past Medical & Surgical


Medical Problems:


(1) Anxiety


(2) CKD (chronic kidney disease) stage 3, GFR 30-59 ml/min


(3) Claudication


(4) COPD (chronic obstructive pulmonary disease)


(5) COPD exacerbation


(6) Dyslipidemia


(7) Elevated troponin


(8) High-grade bladder cancer


(9) History of smoking greater than 50 pack years


(10) HTN (hypertension)


(11) Hypoxia


(12) RBBB (right bundle branch block)


(13) Reflux esophagitis


(14) Shortness of breath


Surgical Problems:


(1) History of endarterectomy


(2) History of tonsillectomy and adenoidectomy








Family History





No pertinent family history





Social History


Smoking Status:  Former Smoker


Drug Use:  none


Marital Status:  


Housing Status:  lives with family


Occupation Status:  retired





Current/Historical Medications


Scheduled


Amlodipine Besylate (Amlodipine Besylate), 10 MG PO DAILY


Atorvastatin (Lipitor), 40 MG PO DAILY


Budesonide/Formoterol Fumarate (Symbicort 160/4.5 Inhaler ), 2 PUFFS INH BID


Carvedilol (Coreg), 12.5 MG PO BID


Citalopram (Citalopram Hydrobromide), 20 MG PO DAILY


Folic Acid (Folic Acid), 1 MG PO DAILY


Home O2 Therapy (Oxygen), 3-4 LITERS NA CONTINOUS


Ipratropium-Albuterol (Duoneb), 3 ML PO QID


Omeprazole (Prilosec), 20 MG PO AS DIRECTED


Prednisone (Prednisone), 20 MG PO Q2D


Prednisone Tab (Prednisone), 10 MG PO Q2D





Scheduled PRN


Albuterol Sulfate (Proventil Hfa), 1 PUFF INH QID PRN for SOB/Wheezing


Lorazepam (Lorazepam), 1 MG PO BID PRN for Anxiety





Allergies


Coded Allergies:  


     Iodinated Diagnostic Agents (Verified  Allergy, Unknown, RASH,FACIAL 

SWELLING, 3/10/18)





Physical Exam


Vital Signs











  Date Time  Temp Pulse Resp B/P (MAP) Pulse Ox O2 Delivery O2 Flow Rate FiO2


 


3/10/18 15:47  87 18 177/87 99 Room Air  


 


3/10/18 14:27    151/90    


 


3/10/18 13:31    182/92    


 


3/10/18 13:25  74 19  100   


 


3/10/18 13:18  79 20 173/86 99 Room Air 2.0 


 


3/10/18 13:16    173/86    


 


3/10/18 12:04  74      


 


3/10/18 11:26 36.5 79 20 171/76 99  3.0 











Physical Exam


Physical Exam 


GENERAL:  He is alert. 


HENT:  Exam performed. 


   Head:  Normocephalic and atraumatic. 


   Right Ear:  External ear normal. No mastoid tenderness. 


   Left Ear: External ear normal. No mastoid tenderness. 


   Mouth/Throat:  The oropharynx is clear and moist. No trismus in the jaw. No 

dental abscesses or uvula swelling. No oropharyngeal exudate or tonsillar 

abscesses. ____


EYES: Conjunctivae and EOM are normal. Pupils are equal, round, and reactive to 

light. Right eye exhibits no discharge. Left eye exhibits no discharge. No 

scleral icterus. ____


NECK: Normal range of motion. Neck supple. No JVD present. No spinous process 

tenderness present. No carotid bruit present. No rigidity. No tracheal 

deviation and normal range of motion present. No Brudzinski's sign and no Kernig

's sign noted. ____


CV: Normal rate, regular rhythm, normal heart sounds and intact distal pulses. 

There is no peripheral edema. Palpable radial pulses bue.  ____


PULM/CHEST:  Effort normal and breath sounds normal. No respiratory distress. 

No stridor. He has no wheezes. He has no rales. 


   Chest Wall:  He exhibits no tenderness. ____


ABD: The abdomen is soft. Bowel sounds are normal. He has no distension. No 

mass is present. There is no tenderness. There is no rebound, no guarding, no 

Wagoner's sign and no tenderness at McBurney's point. Rovsig negative ________


MUSC/SKEL: Normal range of motion. There is no peripheral edema, tenderness or 

deformity. ________


LYMPH: No cervical adenopathy. ____


NEURO: He is alert and oriented x 1 to place only, not to person or time. 

Strength is a 5 bilaterally in the upper and lower extremieties. No cranial 

nerve deficit.  Gait within normal limits.


SKIN: Skin is warm and dry. He is not diaphoretic. ____


PSYCH: He has a normal mood and affect. His behavior is normal. Judgment and 

thought content normal. ____





Medical Decision & Procedures


ER Provider


Diagnostic Interpretation:


Radiology results as stated below per my review and radiologist interpretation: 











PEG 1 OR 2 VIEW ROUTINE





HISTORY:  72 years-old Male recurrent falls acute pelvic pain status post fall





COMPARISON: None available





TECHNIQUE: Single AP view of the pelvis





FINDINGS: 


The bones appear mildly demineralized. Moderate degenerative changes of the


bilateral hips. Bilateral proximal femora appear intact. No pelvic ring fracture


identified. Image sacrum appears intact. Degenerative changes are noted within


the imaged lower lumbar spine. Bilateral vascular stent graft of the pelvis with


peripheral vascular disease. Surgical clip projects over the medial right upper


thigh.





IMPRESSION: No acute fracture or dislocation. 








The above report was generated using voice recognition software. It may contain


grammatical, syntax or spelling errors.








Electronically signed by:  Armando Bird M.D.


3/10/2018 1:02 PM





Dictated Date/Time:  3/10/2018 1:01 PM














HEAD WITHOUT CONTRAST (CT)





CLINICAL HISTORY: 72 years-old Male with recurrent falls.  Acute head injury


status post fall.  





TECHNIQUE: Multiple axial CT images of the head were obtained without contrast. 


A dose lowering technique was utilized adhering to the principles of ALARA.





CT DOSE:  1020.07 mGy.cm





COMPARISON: Head CT 12/24/2016.





FINDINGS: 





No acute intracranial hemorrhage, midline shift, intracranial mass,


hydrocephalus, or territorial ischemia. Moderate atrophy with ex vacuo


ventricular megaly. Patchy areas of decreased attenuation again noted within the


white matter of this rib measures bilaterally, predominantly within a


periventricular location compatible with chronic microvascular ischemic changes.


1.5 cm CSF attenuating structure adjacent to the left parietal lobe near the


vertex seen on image 23 series 2 is unchanged which may reflect a prominent


sulcus or arachnoid cyst.





The calvarium is intact. Small right mastoid effusion. Left mastoid air cells


are clear. Hypoplasia of the frontal sinuses. Paranasal sinuses appear generally


clear. Soft tissues are unremarkable. Orbits are symmetric.





IMPRESSION:  No acute intracranial abnormality.








The above report was generated using voice recognition software. It may contain


grammatical, syntax or spelling errors.








Electronically signed by:  Armando Bird M.D.


3/10/2018 12:29 PM





Dictated Date/Time:  3/10/2018 12:25 PM











CHEST 2 VIEWS ROUTINE





HISTORY:  72 years-old Male recurrent falls acute chest injury status post fall





COMPARISON: Chest radiograph 1/5/2018





TECHNIQUE: AP and lateral views of the chest





FINDINGS: 


Cardiac silhouette is upper limits of normal in size. Opacity of the medial


right lung apex is unchanged which may reflect a vascular pedicle and/or


pleural-parenchymal scarring, indeterminate. Atherosclerosis of the aorta.


Opacity of the right cardiophrenic angle suggests prominent epicardial fat pad.


Emphysematous changes are redemonstrated without pneumothorax, pleural effusion,


focal airspace consolidation or overt pulmonary edema. Degenerative changes are


seen at the shoulders and spine. Remote appearing bilateral rib fractures.





IMPRESSION: Emphysema without acute process. 








The above report was generated using voice recognition software. It may contain


grammatical, syntax or spelling errors.








Electronically signed by:  Armando Bird M.D.


3/10/2018 1:01 PM





Dictated Date/Time:  3/10/2018 12:59 PM














CERVICAL SPINE W/O





CLINICAL HISTORY: 72 years-old Male with recurrent falls.  Acute neck injury


status post fall  





COMPARISON: CT head of same day.





TECHNIQUE: Multiple axial CT images of the cervical spine were obtained without


contrast.  A dose lowering technique was utilized adhering to the principles of


ALARA.





FINDINGS:


No acute fracture or subluxation is identified. Bones appear mildly


demineralized. There is slight reversal the normal cervical lordosis centered at


C4-C5 where there is moderate severe intervertebral disc space narrowing.


Additionally, there is moderate intervertebral disc space narrowing at T6-T7. At


least moderate multilevel endplate spurring with moderate to severe multilevel


facet arthrosis. 4 mm anterolisthesis C3 on C4 is likely secondary to


long-standing facet arthrosis. Evaluation of the central canal and neuroforamen


is better assessed by MRI. No definite high-grade central canal narrowing.


Multilevel foraminal stenosis, for example there is severe left-sided foraminal


narrowing at C6-C7.





Small bilateral mastoid effusions. Advanced emphysema of the imaged lung apices.


Soft tissues are unremarkable. Atherosclerosis of the carotid bulbs..





IMPRESSION:  


1. No acute cervical spine fracture identified.


2. 4 mm anterolisthesis C3 on C4 is likely secondary to long-standing facet


disease. No definite acute subluxation identified.


3. Multilevel intervertebral disc space narrowing with endplate spurring and


facet arthropathy as above.


4. Advanced emphysema.








The above report was generated using voice recognition software. It may contain


grammatical, syntax or spelling errors.








Electronically signed by:  Armando Bird M.D.


3/10/2018 12:59 PM





Dictated Date/Time:  3/10/2018 12:55 PM





Laboratory Results


3/10/18 11:55








Red Blood Count 4.36, Mean Corpuscular Volume 88.5, Mean Corpuscular Hemoglobin 

28.9, Mean Corpuscular Hemoglobin Concent 32.6, Mean Platelet Volume 9.2, 

Neutrophils (%) (Auto) 56.2, Lymphocytes (%) (Auto) 27.6, Monocytes (%) (Auto) 

11.7, Eosinophils (%) (Auto) 3.7, Basophils (%) (Auto) 0.5, Neutrophils # (Auto

) 5.37, Lymphocytes # (Auto) 2.64, Monocytes # (Auto) 1.12, Eosinophils # (Auto

) 0.35, Basophils # (Auto) 0.05





3/10/18 11:55

















Test


  3/10/18


11:55 3/10/18


11:57 3/10/18


14:25


 


White Blood Count


  9.56 K/uL


(4.8-10.8) 


  


 


 


Red Blood Count


  4.36 M/uL


(4.7-6.1) 


  


 


 


Hemoglobin


  12.6 g/dL


(14.0-18.0) 


  


 


 


Hematocrit 38.6 % (42-52)   


 


Mean Corpuscular Volume


  88.5 fL


() 


  


 


 


Mean Corpuscular Hemoglobin


  28.9 pg


(25-34) 


  


 


 


Mean Corpuscular Hemoglobin


Concent 32.6 g/dl


(32-36) 


  


 


 


Platelet Count


  205 K/uL


(130-400) 


  


 


 


Mean Platelet Volume


  9.2 fL


(7.4-10.4) 


  


 


 


Neutrophils (%) (Auto) 56.2 %   


 


Lymphocytes (%) (Auto) 27.6 %   


 


Monocytes (%) (Auto) 11.7 %   


 


Eosinophils (%) (Auto) 3.7 %   


 


Basophils (%) (Auto) 0.5 %   


 


Neutrophils # (Auto)


  5.37 K/uL


(1.4-6.5) 


  


 


 


Lymphocytes # (Auto)


  2.64 K/uL


(1.2-3.4) 


  


 


 


Monocytes # (Auto)


  1.12 K/uL


(0.11-0.59) 


  


 


 


Eosinophils # (Auto)


  0.35 K/uL


(0-0.5) 


  


 


 


Basophils # (Auto)


  0.05 K/uL


(0-0.2) 


  


 


 


RDW Standard Deviation


  44.1 fL


(36.4-46.3) 


  


 


 


RDW Coefficient of Variation


  13.6 %


(11.5-14.5) 


  


 


 


Immature Granulocyte % (Auto) 0.3 %   


 


Immature Granulocyte # (Auto)


  0.03 K/uL


(0.00-0.02) 


  


 


 


Prothrombin Time


  10.1 SECONDS


(9.0-12.0) 


  


 


 


Prothromb Time International


Ratio 1.0 (0.9-1.1) 


  


  


 


 


Activated Partial


Thromboplast Time 27.9 SECONDS


(21.0-31.0) 


  


 


 


Partial Thromboplastin Ratio 1.1   


 


Anion Gap


  5.0 mmol/L


(3-11) 


  


 


 


Est Creatinine Clear Calc


Drug Dose 38.0 ml/min 


  


  


 


 


Estimated GFR (


American) 49.5 


  


  


 


 


Estimated GFR (Non-


American 42.7 


  


  


 


 


BUN/Creatinine Ratio 11.7 (10-20)   


 


Lactic Acid Level


  0.9 mmol/L


(0.4-2.0) 


  


 


 


Calcium Level


  8.8 mg/dl


(8.5-10.1) 


  


 


 


Total Bilirubin


  0.3 mg/dl


(0.2-1) 


  


 


 


Aspartate Amino Transf


(AST/SGOT) 10 U/L (15-37) 


  


  


 


 


Alanine Aminotransferase


(ALT/SGPT) 15 U/L (12-78) 


  


  


 


 


Alkaline Phosphatase


  92 U/L


() 


  


 


 


Troponin I


  < 0.015 ng/ml


(0-0.045) 


  


 


 


Total Protein


  6.7 gm/dl


(6.4-8.2) 


  


 


 


Albumin


  3.5 gm/dl


(3.4-5.0) 


  


 


 


Globulin


  3.2 gm/dl


(2.5-4.0) 


  


 


 


Albumin/Globulin Ratio 1.1 (0.9-2)   


 


Lipase


  190 U/L


() 


  


 


 


Influenza Type A Antigen


  


  Neg for Influ


A (NEG) 


 


 


Influenza Type B Antigen


  


  Neg for Influ


B (NEG) 


 


 


Urine Color   YELLOW 


 


Urine Appearance   CLEAR (CLEAR) 


 


Urine pH   5.0 (4.5-7.5) 


 


Urine Specific Gravity


  


  


  1.024


(1.000-1.030)


 


Urine Protein   NEG (NEG) 


 


Urine Glucose (UA)   NEG (NEG) 


 


Urine Ketones   NEG (NEG) 


 


Urine Occult Blood   NEG (NEG) 


 


Urine Nitrite   NEG (NEG) 


 


Urine Bilirubin   NEG (NEG) 


 


Urine Urobilinogen   NEG (NEG) 


 


Urine Leukocyte Esterase   NEG (NEG) 





Laboratory results reviewed by me





ED Course


1140: The patient was evaluated in room A12. A complete history and physical 

exam was performed. 





1326: Vitals are stable. Labs within normal limits. Patient is ambulating 

without difficulty. Imaging including CT head and C spine negative.  Urine 

within normal limits. Had a long discussion with the family at bedside about 

possibly admitting the patient to the hospital given his recent falls.  I 

discussed the possibility of an inpatient possible neurology consult, PT OT 

evaluation, continual telemetry monitoring, and possible placement in a rehab/

long term acute care facility as the benefits of staying in the hospital.. The 

family states they are not interested in being hospitalized. They do not want 

to the patient to be admitted to a care facility or to the hospital as they 

feel comfortable taking care of him themselves. I did discus with them that 

they would feel safe with this given that he has recurrent falls. They do not 

want him to be admitted to the hospital or to be placed in rehab or acute care 

facility and they would like to take the patient home. DISCHARGE - Plan of care 

discussed with patient and questions answered. The patient was given both 

verbal and printed discharge instructions. The patient verbalized understanding 

and ability to comply. The patient is to seek outpatient follow up as noted in 

the discharge instructions. The patient verbalized understanding and ability to 

comply. The patient is discharged in stable condition. The patient was 

instructed to return for worsening symptoms.





Medical Decision


Vitals are stable. Labs within normal limits. Patient is ambulating without 

difficulty. Imaging including CT head and C spine negative.  Urine within 

normal limits. Had a long discussion with the family at bedside about possibly 

admitting the patient to the hospital given his recent falls.  I discussed the 

possibility of an inpatient possible neurology consult, PT OT evaluation, 

continual telemetry monitoring, and possible placement in a rehab/long term 

acute care facility as the benefits of staying in the hospital.. The family 

states they are not interested in being hospitalized. They do not want to the 

patient to be admitted to a care facility or to the hospital as they feel 

comfortable taking care of him themselves. I did discus with them that they 

would feel safe with this given that he has recurrent falls. They do not want 

him to be admitted to the hospital or to be placed in rehab or acute care 

facility and they would like to take the patient home. DISCHARGE - Plan of care 

discussed with patient and questions answered. The patient was given both 

verbal and printed discharge instructions. The patient verbalized understanding 

and ability to comply. The patient is to seek outpatient follow up as noted in 

the discharge instructions. The patient verbalized understanding and ability to 

comply. The patient is discharged in stable condition. The patient was 

instructed to return for worsening symptoms.





Medication Reconcilliation


Current Medication List:  was personally reviewed by me





Blood Pressure Screening


Patient's blood pressure:  Elevated blood pressure


Blood pressure disposition:  Elevated BP felt to be situational





Impression





 Primary Impression:  


 Fall





Scribe Attestation


The scribe's documentation has been prepared under my direction and personally 

reviewed by me in its entirety. I confirm that the note above accurately 

reflects all work, treatment, procedures, and medical decision making performed 

by me.





The chart was completed utilizing Dragon Speech voice recognition software. 

Grammatical errors, random word insertions, pronoun errors, and incomplete 

sentences are an occasional consequence of this system due to software 

limitations, ambient noise, and hardware issues. Any formal questions or 

concerns about the content, text, or information contained within the body of 

this dictation should be directly addressed to the physician for clarification.





Departure Information


Dispostion


Home / Self-Care





Referrals


No Doctor, Assigned (PCP)





Forms


HOME CARE DOCUMENTATION FORM,                                                 

               IMPORTANT VISIT INFORMATION





Patient Instructions


My [facility]





Additional Instructions





Return to the emergency department if the patient falls again, develops fever 

greater than 100.4, has chest pain, shortness of breath, lose consciousness, 

has seizure, has blood in his urine or stool.





Problem Qualifiers








 Primary Impression:  


 Fall


 Encounter type:  initial encounter  Qualified Codes:  W19.XXXA - Unspecified 

fall, initial encounter

## 2018-03-10 NOTE — DIAGNOSTIC IMAGING REPORT
CHEST 2 VIEWS ROUTINE



HISTORY:  72 years-old Male recurrent falls acute chest injury status post fall



COMPARISON: Chest radiograph 1/5/2018



TECHNIQUE: AP and lateral views of the chest



FINDINGS: 

Cardiac silhouette is upper limits of normal in size. Opacity of the medial

right lung apex is unchanged which may reflect a vascular pedicle and/or

pleural-parenchymal scarring, indeterminate. Atherosclerosis of the aorta.

Opacity of the right cardiophrenic angle suggests prominent epicardial fat pad.

Emphysematous changes are redemonstrated without pneumothorax, pleural effusion,

focal airspace consolidation or overt pulmonary edema. Degenerative changes are

seen at the shoulders and spine. Remote appearing bilateral rib fractures.



IMPRESSION: Emphysema without acute process. 







The above report was generated using voice recognition software. It may contain

grammatical, syntax or spelling errors.







Electronically signed by:  Armando Bird M.D.

3/10/2018 1:01 PM



Dictated Date/Time:  3/10/2018 12:59 PM

## 2018-03-10 NOTE — DIAGNOSTIC IMAGING REPORT
PELVIS 1 OR 2 VIEW ROUTINE



HISTORY:  72 years-old Male recurrent falls acute pelvic pain status post fall



COMPARISON: None available



TECHNIQUE: Single AP view of the pelvis



FINDINGS: 

The bones appear mildly demineralized. Moderate degenerative changes of the

bilateral hips. Bilateral proximal femora appear intact. No pelvic ring fracture

identified. Image sacrum appears intact. Degenerative changes are noted within

the imaged lower lumbar spine. Bilateral vascular stent graft of the pelvis with

peripheral vascular disease. Surgical clip projects over the medial right upper

thigh.



IMPRESSION: No acute fracture or dislocation. 







The above report was generated using voice recognition software. It may contain

grammatical, syntax or spelling errors.







Electronically signed by:  Armando Bird M.D.

3/10/2018 1:02 PM



Dictated Date/Time:  3/10/2018 1:01 PM

## 2018-03-10 NOTE — DIAGNOSTIC IMAGING REPORT
CERVICAL SPINE W/O



CLINICAL HISTORY: 72 years-old Male with recurrent falls.  Acute neck injury

status post fall  



COMPARISON: CT head of same day.



TECHNIQUE: Multiple axial CT images of the cervical spine were obtained without

contrast.  A dose lowering technique was utilized adhering to the principles of

ALARA.



FINDINGS:

No acute fracture or subluxation is identified. Bones appear mildly

demineralized. There is slight reversal the normal cervical lordosis centered at

C4-C5 where there is moderate severe intervertebral disc space narrowing.

Additionally, there is moderate intervertebral disc space narrowing at T6-T7. At

least moderate multilevel endplate spurring with moderate to severe multilevel

facet arthrosis. 4 mm anterolisthesis C3 on C4 is likely secondary to

long-standing facet arthrosis. Evaluation of the central canal and neuroforamen

is better assessed by MRI. No definite high-grade central canal narrowing.

Multilevel foraminal stenosis, for example there is severe left-sided foraminal

narrowing at C6-C7.



Small bilateral mastoid effusions. Advanced emphysema of the imaged lung apices.

Soft tissues are unremarkable. Atherosclerosis of the carotid bulbs..



IMPRESSION:  

1. No acute cervical spine fracture identified.

2. 4 mm anterolisthesis C3 on C4 is likely secondary to long-standing facet

disease. No definite acute subluxation identified.

3. Multilevel intervertebral disc space narrowing with endplate spurring and

facet arthropathy as above.

4. Advanced emphysema.







The above report was generated using voice recognition software. It may contain

grammatical, syntax or spelling errors.









Electronically signed by:  Armando Bird M.D.

3/10/2018 12:59 PM



Dictated Date/Time:  3/10/2018 12:55 PM

## 2018-03-10 NOTE — DIAGNOSTIC IMAGING REPORT
HEAD WITHOUT CONTRAST (CT)



CLINICAL HISTORY: 72 years-old Male with recurrent falls.  Acute head injury

status post fall.  



TECHNIQUE: Multiple axial CT images of the head were obtained without contrast. 

A dose lowering technique was utilized adhering to the principles of ALARA.



CT DOSE:  1020.07 mGy.cm



COMPARISON: Head CT 12/24/2016.



FINDINGS: 



No acute intracranial hemorrhage, midline shift, intracranial mass,

hydrocephalus, or territorial ischemia. Moderate atrophy with ex vacuo

ventricular megaly. Patchy areas of decreased attenuation again noted within the

white matter of this rib measures bilaterally, predominantly within a

periventricular location compatible with chronic microvascular ischemic changes.

1.5 cm CSF attenuating structure adjacent to the left parietal lobe near the

vertex seen on image 23 series 2 is unchanged which may reflect a prominent

sulcus or arachnoid cyst.



The calvarium is intact. Small right mastoid effusion. Left mastoid air cells

are clear. Hypoplasia of the frontal sinuses. Paranasal sinuses appear generally

clear. Soft tissues are unremarkable. Orbits are symmetric.



IMPRESSION:  No acute intracranial abnormality.







The above report was generated using voice recognition software. It may contain

grammatical, syntax or spelling errors.







Electronically signed by:  Armando Bird M.D.

3/10/2018 12:29 PM



Dictated Date/Time:  3/10/2018 12:25 PM